# Patient Record
Sex: FEMALE | Race: WHITE | NOT HISPANIC OR LATINO | Employment: OTHER | URBAN - METROPOLITAN AREA
[De-identification: names, ages, dates, MRNs, and addresses within clinical notes are randomized per-mention and may not be internally consistent; named-entity substitution may affect disease eponyms.]

---

## 2021-08-27 ENCOUNTER — HOSPITAL ENCOUNTER (INPATIENT)
Facility: CLINIC | Age: 71
LOS: 2 days | Discharge: HOME | DRG: 522 | End: 2021-08-29
Attending: EMERGENCY MEDICINE | Admitting: INTERNAL MEDICINE
Payer: COMMERCIAL

## 2021-08-27 ENCOUNTER — ANESTHESIA EVENT (OUTPATIENT)
Dept: SURGERY | Facility: CLINIC | Age: 71
DRG: 522 | End: 2021-08-27
Payer: COMMERCIAL

## 2021-08-27 ENCOUNTER — APPOINTMENT (OUTPATIENT)
Dept: GENERAL RADIOLOGY | Facility: CLINIC | Age: 71
DRG: 522 | End: 2021-08-27
Attending: EMERGENCY MEDICINE
Payer: COMMERCIAL

## 2021-08-27 DIAGNOSIS — T07.XXXA MULTIPLE ABRASIONS: ICD-10-CM

## 2021-08-27 DIAGNOSIS — S72.001A CLOSED FRACTURE OF RIGHT HIP, INITIAL ENCOUNTER (H): ICD-10-CM

## 2021-08-27 PROBLEM — G62.9 NEUROPATHY: Status: ACTIVE | Noted: 2021-08-27

## 2021-08-27 PROBLEM — E87.6 HYPOKALEMIA: Status: ACTIVE | Noted: 2021-08-27

## 2021-08-27 PROBLEM — I10 ESSENTIAL HYPERTENSION: Status: ACTIVE | Noted: 2021-08-27

## 2021-08-27 LAB
ABO/RH(D): NORMAL
ANION GAP SERPL CALCULATED.3IONS-SCNC: 11 MMOL/L (ref 3–14)
ANTIBODY SCREEN: NEGATIVE
APTT PPP: 25 SECONDS (ref 22–38)
ATRIAL RATE - MUSE: 92 BPM
BASOPHILS # BLD AUTO: 0 10E3/UL (ref 0–0.2)
BASOPHILS NFR BLD AUTO: 0 %
BUN SERPL-MCNC: 16 MG/DL (ref 7–30)
CALCIUM SERPL-MCNC: 9.2 MG/DL (ref 8.5–10.1)
CHLORIDE BLD-SCNC: 98 MMOL/L (ref 94–109)
CO2 SERPL-SCNC: 22 MMOL/L (ref 20–32)
CREAT SERPL-MCNC: 0.54 MG/DL (ref 0.52–1.04)
DIASTOLIC BLOOD PRESSURE - MUSE: NORMAL MMHG
EOSINOPHIL # BLD AUTO: 0 10E3/UL (ref 0–0.7)
EOSINOPHIL NFR BLD AUTO: 0 %
ERYTHROCYTE [DISTWIDTH] IN BLOOD BY AUTOMATED COUNT: 13 % (ref 10–15)
GFR SERPL CREATININE-BSD FRML MDRD: >90 ML/MIN/1.73M2
GLUCOSE BLD-MCNC: 210 MG/DL (ref 70–99)
HCT VFR BLD AUTO: 35.9 % (ref 35–47)
HGB BLD-MCNC: 12.2 G/DL (ref 11.7–15.7)
IMM GRANULOCYTES # BLD: 0 10E3/UL
IMM GRANULOCYTES NFR BLD: 1 %
INR PPP: 1.04 (ref 0.85–1.15)
INTERPRETATION ECG - MUSE: NORMAL
LYMPHOCYTES # BLD AUTO: 0.9 10E3/UL (ref 0.8–5.3)
LYMPHOCYTES NFR BLD AUTO: 12 %
MCH RBC QN AUTO: 27.7 PG (ref 26.5–33)
MCHC RBC AUTO-ENTMCNC: 34 G/DL (ref 31.5–36.5)
MCV RBC AUTO: 81 FL (ref 78–100)
MONOCYTES # BLD AUTO: 0.4 10E3/UL (ref 0–1.3)
MONOCYTES NFR BLD AUTO: 6 %
NEUTROPHILS # BLD AUTO: 6.3 10E3/UL (ref 1.6–8.3)
NEUTROPHILS NFR BLD AUTO: 81 %
NRBC # BLD AUTO: 0 10E3/UL
NRBC BLD AUTO-RTO: 0 /100
P AXIS - MUSE: 66 DEGREES
PLATELET # BLD AUTO: 232 10E3/UL (ref 150–450)
POTASSIUM BLD-SCNC: 3.1 MMOL/L (ref 3.4–5.3)
POTASSIUM BLD-SCNC: 3.1 MMOL/L (ref 3.4–5.3)
PR INTERVAL - MUSE: 216 MS
QRS DURATION - MUSE: 88 MS
QT - MUSE: 382 MS
QTC - MUSE: 472 MS
R AXIS - MUSE: -10 DEGREES
RBC # BLD AUTO: 4.41 10E6/UL (ref 3.8–5.2)
SARS-COV-2 RNA RESP QL NAA+PROBE: NEGATIVE
SODIUM SERPL-SCNC: 131 MMOL/L (ref 133–144)
SPECIMEN EXPIRATION DATE: NORMAL
SYSTOLIC BLOOD PRESSURE - MUSE: NORMAL MMHG
T AXIS - MUSE: 61 DEGREES
VENTRICULAR RATE- MUSE: 92 BPM
WBC # BLD AUTO: 7.7 10E3/UL (ref 4–11)

## 2021-08-27 PROCEDURE — 36415 COLL VENOUS BLD VENIPUNCTURE: CPT | Performed by: ORTHOPAEDIC SURGERY

## 2021-08-27 PROCEDURE — 258N000003 HC RX IP 258 OP 636: Performed by: INTERNAL MEDICINE

## 2021-08-27 PROCEDURE — 250N000011 HC RX IP 250 OP 636: Performed by: INTERNAL MEDICINE

## 2021-08-27 PROCEDURE — 84132 ASSAY OF SERUM POTASSIUM: CPT | Performed by: INTERNAL MEDICINE

## 2021-08-27 PROCEDURE — 80048 BASIC METABOLIC PNL TOTAL CA: CPT | Performed by: EMERGENCY MEDICINE

## 2021-08-27 PROCEDURE — 71045 X-RAY EXAM CHEST 1 VIEW: CPT

## 2021-08-27 PROCEDURE — 96374 THER/PROPH/DIAG INJ IV PUSH: CPT

## 2021-08-27 PROCEDURE — 120N000001 HC R&B MED SURG/OB

## 2021-08-27 PROCEDURE — 250N000013 HC RX MED GY IP 250 OP 250 PS 637: Performed by: INTERNAL MEDICINE

## 2021-08-27 PROCEDURE — 73502 X-RAY EXAM HIP UNI 2-3 VIEWS: CPT

## 2021-08-27 PROCEDURE — 85025 COMPLETE CBC W/AUTO DIFF WBC: CPT | Performed by: ORTHOPAEDIC SURGERY

## 2021-08-27 PROCEDURE — 86900 BLOOD TYPING SEROLOGIC ABO: CPT | Performed by: ORTHOPAEDIC SURGERY

## 2021-08-27 PROCEDURE — 250N000011 HC RX IP 250 OP 636: Performed by: EMERGENCY MEDICINE

## 2021-08-27 PROCEDURE — 99223 1ST HOSP IP/OBS HIGH 75: CPT | Mod: AI | Performed by: INTERNAL MEDICINE

## 2021-08-27 PROCEDURE — 87635 SARS-COV-2 COVID-19 AMP PRB: CPT | Performed by: EMERGENCY MEDICINE

## 2021-08-27 PROCEDURE — 99285 EMERGENCY DEPT VISIT HI MDM: CPT | Mod: 25

## 2021-08-27 PROCEDURE — 82306 VITAMIN D 25 HYDROXY: CPT | Performed by: PHYSICIAN ASSISTANT

## 2021-08-27 PROCEDURE — 85730 THROMBOPLASTIN TIME PARTIAL: CPT | Performed by: EMERGENCY MEDICINE

## 2021-08-27 PROCEDURE — 85610 PROTHROMBIN TIME: CPT | Performed by: EMERGENCY MEDICINE

## 2021-08-27 PROCEDURE — 36415 COLL VENOUS BLD VENIPUNCTURE: CPT | Performed by: EMERGENCY MEDICINE

## 2021-08-27 PROCEDURE — C9803 HOPD COVID-19 SPEC COLLECT: HCPCS

## 2021-08-27 RX ORDER — CEFAZOLIN SODIUM 2 G/100ML
2 INJECTION, SOLUTION INTRAVENOUS SEE ADMIN INSTRUCTIONS
Status: DISCONTINUED | OUTPATIENT
Start: 2021-08-27 | End: 2021-08-28 | Stop reason: HOSPADM

## 2021-08-27 RX ORDER — HYDROCHLOROTHIAZIDE 25 MG/1
25 TABLET ORAL DAILY
COMMUNITY

## 2021-08-27 RX ORDER — NALOXONE HYDROCHLORIDE 0.4 MG/ML
0.4 INJECTION, SOLUTION INTRAMUSCULAR; INTRAVENOUS; SUBCUTANEOUS
Status: DISCONTINUED | OUTPATIENT
Start: 2021-08-27 | End: 2021-08-29 | Stop reason: HOSPADM

## 2021-08-27 RX ORDER — HYDRALAZINE HYDROCHLORIDE 20 MG/ML
5 INJECTION INTRAMUSCULAR; INTRAVENOUS EVERY 4 HOURS PRN
Status: DISCONTINUED | OUTPATIENT
Start: 2021-08-27 | End: 2021-08-29 | Stop reason: HOSPADM

## 2021-08-27 RX ORDER — NALOXONE HYDROCHLORIDE 0.4 MG/ML
0.2 INJECTION, SOLUTION INTRAMUSCULAR; INTRAVENOUS; SUBCUTANEOUS
Status: DISCONTINUED | OUTPATIENT
Start: 2021-08-27 | End: 2021-08-29 | Stop reason: HOSPADM

## 2021-08-27 RX ORDER — CEFAZOLIN SODIUM 2 G/100ML
2 INJECTION, SOLUTION INTRAVENOUS
Status: DISCONTINUED | OUTPATIENT
Start: 2021-08-28 | End: 2021-08-28 | Stop reason: HOSPADM

## 2021-08-27 RX ORDER — TRANEXAMIC ACID 10 MG/ML
1 INJECTION, SOLUTION INTRAVENOUS ONCE
Status: COMPLETED | OUTPATIENT
Start: 2021-08-28 | End: 2021-08-28

## 2021-08-27 RX ORDER — POTASSIUM CHLORIDE 1500 MG/1
20 TABLET, EXTENDED RELEASE ORAL ONCE
Status: COMPLETED | OUTPATIENT
Start: 2021-08-27 | End: 2021-08-27

## 2021-08-27 RX ORDER — MULTIVIT-MIN/IRON/FOLIC ACID/K 18-600-40
1 CAPSULE ORAL DAILY
COMMUNITY

## 2021-08-27 RX ORDER — HYDROMORPHONE HYDROCHLORIDE 1 MG/ML
0.5 INJECTION, SOLUTION INTRAMUSCULAR; INTRAVENOUS; SUBCUTANEOUS
Status: DISCONTINUED | OUTPATIENT
Start: 2021-08-27 | End: 2021-08-27

## 2021-08-27 RX ORDER — GABAPENTIN 100 MG/1
100 CAPSULE ORAL DAILY
Status: DISCONTINUED | OUTPATIENT
Start: 2021-08-27 | End: 2021-08-28

## 2021-08-27 RX ORDER — HYDROMORPHONE HCL IN WATER/PF 6 MG/30 ML
0.2 PATIENT CONTROLLED ANALGESIA SYRINGE INTRAVENOUS
Status: DISCONTINUED | OUTPATIENT
Start: 2021-08-27 | End: 2021-08-28

## 2021-08-27 RX ORDER — CHLORAL HYDRATE 500 MG
1 CAPSULE ORAL DAILY
COMMUNITY

## 2021-08-27 RX ORDER — ACETAMINOPHEN 650 MG/1
650 SUPPOSITORY RECTAL EVERY 6 HOURS PRN
Status: DISCONTINUED | OUTPATIENT
Start: 2021-08-27 | End: 2021-08-29 | Stop reason: HOSPADM

## 2021-08-27 RX ORDER — SODIUM CHLORIDE 9 MG/ML
INJECTION, SOLUTION INTRAVENOUS CONTINUOUS
Status: DISCONTINUED | OUTPATIENT
Start: 2021-08-27 | End: 2021-08-28 | Stop reason: ALTCHOICE

## 2021-08-27 RX ORDER — ONDANSETRON 4 MG/1
4 TABLET, ORALLY DISINTEGRATING ORAL EVERY 6 HOURS PRN
Status: DISCONTINUED | OUTPATIENT
Start: 2021-08-27 | End: 2021-08-28

## 2021-08-27 RX ORDER — ONDANSETRON 2 MG/ML
4 INJECTION INTRAMUSCULAR; INTRAVENOUS EVERY 6 HOURS PRN
Status: DISCONTINUED | OUTPATIENT
Start: 2021-08-27 | End: 2021-08-28

## 2021-08-27 RX ORDER — AMOXICILLIN 250 MG
2 CAPSULE ORAL 2 TIMES DAILY PRN
Status: DISCONTINUED | OUTPATIENT
Start: 2021-08-27 | End: 2021-08-29 | Stop reason: HOSPADM

## 2021-08-27 RX ORDER — AMOXICILLIN 250 MG
1 CAPSULE ORAL 2 TIMES DAILY PRN
Status: DISCONTINUED | OUTPATIENT
Start: 2021-08-27 | End: 2021-08-29 | Stop reason: HOSPADM

## 2021-08-27 RX ORDER — POTASSIUM CHLORIDE 7.45 MG/ML
10 INJECTION INTRAVENOUS ONCE
Status: DISCONTINUED | OUTPATIENT
Start: 2021-08-27 | End: 2021-08-27

## 2021-08-27 RX ORDER — OXYCODONE HYDROCHLORIDE 5 MG/1
5 TABLET ORAL EVERY 4 HOURS PRN
Status: DISCONTINUED | OUTPATIENT
Start: 2021-08-27 | End: 2021-08-28

## 2021-08-27 RX ORDER — ACETAMINOPHEN 325 MG/1
650 TABLET ORAL EVERY 6 HOURS PRN
Status: DISCONTINUED | OUTPATIENT
Start: 2021-08-27 | End: 2021-08-28

## 2021-08-27 RX ORDER — LIDOCAINE 40 MG/G
CREAM TOPICAL
Status: DISCONTINUED | OUTPATIENT
Start: 2021-08-27 | End: 2021-08-28

## 2021-08-27 RX ORDER — TRANEXAMIC ACID 10 MG/ML
1 INJECTION, SOLUTION INTRAVENOUS ONCE
Status: DISCONTINUED | OUTPATIENT
Start: 2021-08-28 | End: 2021-08-28 | Stop reason: HOSPADM

## 2021-08-27 RX ADMIN — ACETAMINOPHEN 650 MG: 325 TABLET, FILM COATED ORAL at 20:38

## 2021-08-27 RX ADMIN — ONDANSETRON 4 MG: 4 TABLET, ORALLY DISINTEGRATING ORAL at 20:38

## 2021-08-27 RX ADMIN — HYDROMORPHONE HYDROCHLORIDE 0.5 MG: 1 INJECTION, SOLUTION INTRAMUSCULAR; INTRAVENOUS; SUBCUTANEOUS at 14:09

## 2021-08-27 RX ADMIN — POTASSIUM CHLORIDE 20 MEQ: 1500 TABLET, EXTENDED RELEASE ORAL at 18:50

## 2021-08-27 RX ADMIN — SODIUM CHLORIDE: 9 INJECTION, SOLUTION INTRAVENOUS at 18:41

## 2021-08-27 ASSESSMENT — MIFFLIN-ST. JEOR: SCORE: 1145.92

## 2021-08-27 ASSESSMENT — ACTIVITIES OF DAILY LIVING (ADL): ADLS_ACUITY_SCORE: 15

## 2021-08-27 ASSESSMENT — ENCOUNTER SYMPTOMS: ARTHRALGIAS: 1

## 2021-08-27 NOTE — ED PROVIDER NOTES
History   Chief Complaint:  Hip Pain       HPI   Lorna Yanez is a 70 year old female with history of hypertension who presents with hip pain. The patient reports that she fell while walking on the sidewalk this afternoon. She fell to the ground, injuring her right hip and right elbow. She denies hitting her head or loss of consciousness. The patient is unable to bear weight on her right leg.     Review of Systems   Musculoskeletal: Positive for arthralgias and gait problem.   All other systems reviewed and are negative.    Allergies:  Amlodipine  Codeine  Losartan    Medications:  Hydrochlorothiazide  Hypertension  Heart murmur    Past Medical History:    No known past medical history    Past Surgical History:    Hip replacement  Knee replacement     Family History:    No known family history    Social History:  Arrives via triage  Accompanied by  in ED    Physical Exam     Patient Vitals for the past 24 hrs:   BP Temp Temp src Pulse Resp SpO2   08/27/21 1340 (!) 145/76 98  F (36.7  C) Temporal 85 18 98 %       Physical Exam  Constitutional: Middle aged white female sitting in chair crying out in pain.  HENT: No signs of trauma.   Eyes: EOM are normal. Pupils are equal, round, and reactive to light.   Neck: Normal range of motion. No JVD present. No cervical adenopathy.  Cardiovascular: Regular rhythm.  Exam reveals no gallop and no friction rub.    No murmur heard.  Pulmonary/Chest: Bilateral breath sounds normal. No wheezes, rhonchi or rales.  Abdominal: Soft. No tenderness. No rebound or guarding.   Musculoskeletal: Right arm abrasion over elbow, full range of motion, no bony tenderness. Abrasion to right hand ulnar aspect of MCP joint little finger, no bony tenderness. Left hand abrasion mid palmar region between thenar and hyperthenar eminence, no bony tenderness. Right hip tenderness posteriorly, patient resists motion. Distal CMS intact.   Lymphadenopathy: No lymphadenopathy.   Neurological:  Alert and oriented to person, place, and time. Normal strength. Coordination normal.   Skin: Skin is warm and dry. No rash noted. No erythema. GCS 15.      Emergency Department Course   ECG  ECG taken at 1548  Sinus rhythm with sinus arrhythmia with 1st degree AV block  Cannot rule out anterior infarct, age undetermined   Rate 92 bpm. TX interval 216 ms. QRS duration 88 ms. QT/QTc 382/472 ms. P-R-T axes 66 -10 61.     Imaging:    XR Chest 1 View  No acute disease.    XR Pelvis and Hip Right 2 Views  Displaced transverse fracture right femoral neck with  typical varus angulation at the fracture site. Mild right hip  degenerative changes.    Read per radiology    Laboratory:    BMP: sodium 131, potassium 3.1, glucose 210 o/w WNL (Creatinine 0.54)     Emergency Department Course:    Reviewed:  I reviewed nursing notes, vitals, past medical history and care everywhere    Assessments:  1359 I obtained history and examined the patient as noted above.   1522 I rechecked the patient and explained findings.     Interventions:  1409 Dilaudid 0.5 mg IV  kcl 10 meq IV   Disposition:  The patient was admitted to the hospital under the care of Dr. Aguayo .     Impression & Plan     Medical Decision Making:  Lorna Yanez is a 70 year-old woman who tripped and fell landing on her right hip. She also abraded her right elbow and scraped her hand. She was unable to get up and feels her hip is broken. She is otherwise healthy. On examination she has pain with nay movement of the right hip and tenderness posteriorly. She has an abrasion on her right elbow, the side of her right hand, and her left palm without any bony tenderness. Patient had an IV started, blood was drawn, and received pain medications and X-ray confirms a right hip fracture of the femoral neck with displacement. Chest X ray unremarkable, EKG unremarkable and lab work so far is unremarkable. The patient will be discussed with orthopedics and the hospitalist and  admitted for orthopedic repair.     Covid-19  Lorna Yanez was evaluated during a global COVID-19 pandemic, which necessitated consideration that the patient might be at risk for infection with the SARS-CoV-2 virus that causes COVID-19.   Applicable protocols for evaluation were followed during the patient's care.   COVID-19 was considered as part of the patient's evaluation. The plan for testing is:  a test was obtained during this visit.    Diagnosis:    ICD-10-CM    1. Closed fracture of right hip, initial encounter (H)  S72.001A    2. Multiple abrasions  T07.XXXA      Scribe Disclosure:  INicola, am serving as a scribe at 1:59 PM on 8/27/2021 to document services personally performed by Trung Richardson MD based on my observations and the provider's statements to me.              Trung Richardson MD  08/27/21 5067

## 2021-08-27 NOTE — PHARMACY-ADMISSION MEDICATION HISTORY
Pharmacy Medication History  Admission medication history interview status for the 8/27/2021 admission is complete. See EPIC admission navigator for prior to admission medications     Location of Interview: Patient room  Medication history sources: Patient    Significant changes made to the medication list:  Added: all medications    In the past week, patient estimated taking medication this percent of the time: greater than 90%    Additional medication history information:   None    Medication reconciliation completed by provider prior to medication history? No    Time spent in this activity: 10 minutes    Prior to Admission medications    Medication Sig Last Dose Taking? Auth Provider   Ascorbic Acid (VITAMIN C) 500 MG CAPS Take 1 capsule by mouth daily 8/27/2021 at Unknown time Yes Unknown, Entered By History   Coenzyme Q10 (COQ10 PO) Take 1 capsule (unknown dose) by mouth daily 8/27/2021 at Unknown time Yes Unknown, Entered By History   fish oil-omega-3 fatty acids 1000 MG capsule Take 2 g by mouth daily 8/27/2021 at Unknown time Yes Unknown, Entered By History   FOLIC ACID PO Take 1 tablet (unknown dose) by mouth daily 8/27/2021 at Unknown time Yes Unknown, Entered By History   hydrochlorothiazide (HYDRODIURIL) 25 MG tablet Take 25 mg by mouth daily 8/27/2021 at Unknown time Yes Unknown, Entered By History   Multiple Vitamins-Minerals (ZINC PO) Take 1 capsule (unknown dose) by mouth daily 8/27/2021 at Unknown time Yes Unknown, Entered By History   VITAMIN D PO Take 1 capsule (unknown dose) by mouth daily 8/27/2021 at Unknown time Yes Unknown, Entered By History       The information provided in this note is only as accurate as the sources available at the time of update(s)

## 2021-08-27 NOTE — H&P
[facility]    History and Physical - Hospitalist Service       Date of Admission:  8/27/2021    Assessment & Plan      Lorna Yanez is a 70 year old female with history of hypertension, osteoarthritis, neuropathy who is admitted on 8/27/2021. She fell outside on a  grating.  An x-ray of the pelvis shows right femoral neck fracture with varus angulation.    Principal Problem:    Closed fracture of right hip, initial encounter (H)    Admit as inpatient    N.p.o., bedrest    Orthopedic consult requested    Treat pain.  Patient says that he does not typically take pain relievers, Dilaudid made her feel somewhat unwell.  If this persists or worsens, could try IV morphine    With the exception of hypokalemia, there are no medical conditions which can be/should be optimized prior to proceeding with surgery    Active Problems:    Multiple abrasions    Local cares    Hypokalemia    Replace potassium per protocol    Essential hypertension    Hyponatremia    Hold hydrochlorothiazide since pain, stress and opioids can all contribute to ADH release and worsen hyponatremia    This is likely hypovolemic hyponatremia, give gentle rehydration    Recheck BMP in a.m.    Hydralazine as needed    Neuropathy    Patient reports a history of neuropathy, details are unknown    Add very low-dose gabapentin, see if she gets relief        Diet: NPO for Medical/Clinical Reasons Except for: Meds    DVT Prophylaxis: Pneumatic Compression Devices  Mckinnon Catheter: Not present  Central Lines: None  Code Status:   Full    Clinically Significant Risk Factors Present on Admission         # Hyponatremia: Na = 131 mmol/L (Ref range: 133 - 144 mmol/L) on admission, will monitor as appropriate           Disposition Plan   Expected discharge: Tomorrow or Sunday, depending on timing for surgery recommended to prior living arrangement once hip fracture is repaired, pain is controlled and patient is functioning at baseline.    "  The patient's care was discussed with the Patient, Patient's Family and ED MD, Dr. Hernandez.    Luz Aguayo MD  [facility]  Securely message with the [employer] Web Console (learn more here)  Text page via [employer] Paging/Directory      ______________________________________________________________________    Chief Complaint   \"I was calling to my granddaughter and the  cover was raised up just a little bit.\"    History of Present Illness   Lorna Yanez is a very healthy 70 year old female with history of hypertension, osteoarthritis, neuropathy who presents emergency department after a fall.    She was meeting her granddaughter in [place] for her birthday lunch (Lorna's birthday is on Sunday).  She says she stepped off the curb and all manhole cover was slightly raised.  She tripped over this, fell on her right side and had immediate, sharp pain in her hip.  She somehow got in the car, despite being unable to bear weight on the right hip.  She scraped her right elbow.    She came to the emergency department for evaluation where x-rays the pelvis show a femoral neck fracture with varus angulation.  She has an abrasion on her right elbow.  She received some IV Dilaudid for pain control and says that strong pain relievers make her feel sick.  She says she has terrible pain in the right hip, \"I cannot take this pain all night long.\"    Lorna is very health-conscious, says she eats a healthy diet and exercises regularly.  She is understandably frustrated at such a minor fall resulted in a hip fracture.  She is admitted for further evaluation and treatment.    Review of Systems    The 10 point Review of Systems is negative other than noted in the HPI or here.     Past Medical History    I have reviewed this patient's medical history and updated it with pertinent information if needed.   Other unspecified back disorder   multiple surgeries   HTN (hypertension)       Neuropathy of both " "feet       OA (osteoarthritis)       Neck pain       Thyroid nodule         Past Surgical History   I have reviewed this patient's surgical history and updated it with pertinent information if needed.  KNEE REPLACEMENT 1/1/2002 - 12/31/2002 Right      BACK SURGERY 1/1/1998 - 12/31/1998   for lumbar L4-5 herniated disc     VEIN STRIPPING          AMINA AND BSO 1/1/2010 - 12/31/2010        (IA) UT KNEE SCOPE MED W LAT MENISCECT WWO DEBRIDE/SHAVE ANY CO          Social History   I have reviewed this patient's social history and updated it with pertinent information if needed.  Former Smoker       Quit: 01/01/1970   Smokeless Tobacco: Never Used           Alcohol Use Standard Drinks/Week Comments   No 0 (1 standard drink = 0.6 oz pure alcohol)           Family History     Diabetes Brother       Hypertension Father       Hyperlipidemia Mother         Relation Name Status Comments   Brother         Father         Mother            Prior to Admission Medications   None     Allergies   Allergies   Allergen Reactions     Codeine Anxiety and Nausea and Vomiting     Rosuvastatin Other (See Comments)     Per H&P  Throat felt swollen.       Amlodipine Other (See Comments)     Losartan Other (See Comments)     \"subjective facial swelling not appreciated on exam\" per H&P  Paresthesias, subjective facial swelling not appreciated on exam       Physical Exam   Vital Signs: Temp: 98  F (36.7  C) Temp src: Temporal BP: (!) 145/76 Pulse: 85   Resp: 18 SpO2: 98 % O2 Device: None (Room air)    Weight: 0 lbs 0 oz    Constitutional: Awake, alert.  Looks distressed due to right hip pain  Eyes: Conjunctiva and pupils examined and normal.  HEENT: Moist mucous membranes, normal dentition.  Respiratory: Clear to auscultation bilaterally  Cardiovascular: Regular rate and rhythm  GI: Soft, non-distended, non-tender, normal bowel sounds.  Lymph/Hematologic: No anterior cervical or supraclavicular adenopathy.  Skin: No rash on exposed skin.  She has a " superficial abrasion overlying the right elbow.  Musculoskeletal: MCPs of both hands are slightly enlarged, consistent with osteoarthritis.  There are no obvious joint effusions, joints are not red or tender  Neurologic: Face is symmetric.  She can move both arms and both legs.  The neurologic examination is nonfocal  Psychiatric: Mood is very pleasant, but somewhat anxious      Data   Data reviewed today: I reviewed all medications, new labs and imaging results over the last 24 hours. I personally reviewed the chest x-ray image(s) showing No infiltrates and the Pelvis x-ray image(s) showing Right femoral neck fracture with varus angulation.    Recent Labs   Lab 08/27/21  1410   *   POTASSIUM 3.1*   CHLORIDE 98   CO2 22   BUN 16   CR 0.54   ANIONGAP 11   LÓPEZ 9.2   *     Recent Results (from the past 24 hour(s))   XR Pelvis and Hip Right 2 Views    Narrative    PELVIS AND HIP RIGHT TWO VIEWS   8/27/2021 2:29 PM     HISTORY: Pain.    COMPARISON: None.      Impression    IMPRESSION: Displaced transverse fracture right femoral neck with  typical varus angulation at the fracture site. Mild right hip  degenerative changes.    RICHARD MCMAHAN MD         SYSTEM ID:  [id number]   XR Chest 1 View    Narrative    CHEST ONE VIEW  8/27/2021 2:29 PM     HISTORY: Question hip fracture;  get chest x-ray if hip fracture.    COMPARISON: None.      Impression    IMPRESSION: No acute disease.    EARL GATES MD         SYSTEM ID:  [id number]

## 2021-08-27 NOTE — ED TRIAGE NOTES
Patient states she fell and injured her right hip.  States she feels it is broken.  Unable to bear weight.

## 2021-08-27 NOTE — PROGRESS NOTES
Ortho Treatment plan    OR tomorrow 8/27 for right hip hemiarthroplasty with Dr. Varma  NPO after midnight  May have diet today  Bed rest  Prefer pure wick over fletcher if at all possible  Pain medication as needed, limit narcotics as able  STAT COVID test  Type and cross  Vit D lab  Pre-op optimization per hospitalist    Formal consult in AM    Nisha Bowman PAC

## 2021-08-27 NOTE — PROGRESS NOTES
RECEIVING UNIT ED HANDOFF REVIEW    ED Nurse Handoff Report was reviewed by: Blanca Rodriguez RN on August 27, 2021 at 4:38 PM

## 2021-08-27 NOTE — ED NOTES
"[facility]  ED Nurse Handoff Report    ED Chief complaint: Hip Pain      ED Diagnosis:   Final diagnoses:   Closed fracture of right hip, initial encounter (H)   Multiple abrasions       Code Status: Full Code    Allergies:   Allergies   Allergen Reactions     Codeine Anxiety and Nausea and Vomiting     Rosuvastatin Other (See Comments)     Per H&P  Throat felt swollen.       Amlodipine Other (See Comments)     Losartan Other (See Comments)     \"subjective facial swelling not appreciated on exam\" per H&P  Paresthesias, subjective facial swelling not appreciated on exam       Patient Story: Patient states she fell and injured her right hip.  States she feels it is broken.  Unable to bear weight.     Focused Assessment:  Right hip pain. Pt c/o feeling hungry but understands she needs to stay NPO.    Treatments and/or interventions provided:   Medications   HYDROmorphone (PF) (DILAUDID) injection 0.5 mg (0.5 mg Intravenous Given 8/27/21 1409)   potassium chloride 10 mEq in 100 mL sterile water intermittent infusion (premix) (has no administration in time range)     Patient's response to treatments and/or interventions: improvement in pain after Dilaudid    To be done/followed up on inpatient unit:  potassium drip if not started in the ED    Does this patient have any cognitive concerns?: NA    Activity level - Baseline/Home:  Independent  Activity Level - Current:   Total Care    Patient's Preferred language: English   Needed?: No    Isolation: None  Infection: Not Applicable  Patient tested for COVID 19 prior to admission: YES  Bariatric?: No    Vital Signs:   Vitals:    08/27/21 1340   BP: (!) 145/76   Pulse: 85   Resp: 18   Temp: 98  F (36.7  C)   TempSrc: Temporal   SpO2: 98%       Cardiac Rhythm:     Was the PSS-3 completed:   Yes  What interventions are required if any?               Family Comments: no family present  OBS brochure/video discussed/provided to patient/family: N/A        "       Name of person given brochure if not patient: NA              Relationship to patient: NA    For the majority of the shift this patient's behavior was Green.   Behavioral interventions performed were NA.    ED NURSE PHONE NUMBER: [phone]

## 2021-08-27 NOTE — PLAN OF CARE
Patient transferred from ED @ 1800. A&O x4, anxious. Bedrest, Denies chest pain or SOB. Rating pain 8-10/10, requested to take meds after dinner. Potassium replaced, recheck @ 2300. IVF infusing. Voiding per jace. NPO after midnight, surgery tomorrow. Will continue to monitor.

## 2021-08-28 ENCOUNTER — APPOINTMENT (OUTPATIENT)
Dept: GENERAL RADIOLOGY | Facility: CLINIC | Age: 71
DRG: 522 | End: 2021-08-28
Attending: ORTHOPAEDIC SURGERY
Payer: COMMERCIAL

## 2021-08-28 ENCOUNTER — ANESTHESIA (OUTPATIENT)
Dept: SURGERY | Facility: CLINIC | Age: 71
DRG: 522 | End: 2021-08-28
Payer: COMMERCIAL

## 2021-08-28 ENCOUNTER — APPOINTMENT (OUTPATIENT)
Dept: GENERAL RADIOLOGY | Facility: CLINIC | Age: 71
DRG: 522 | End: 2021-08-28
Attending: PHYSICIAN ASSISTANT
Payer: COMMERCIAL

## 2021-08-28 LAB
ANION GAP SERPL CALCULATED.3IONS-SCNC: 2 MMOL/L (ref 3–14)
BUN SERPL-MCNC: 12 MG/DL (ref 7–30)
CALCIUM SERPL-MCNC: 8.6 MG/DL (ref 8.5–10.1)
CHLORIDE BLD-SCNC: 100 MMOL/L (ref 94–109)
CO2 SERPL-SCNC: 30 MMOL/L (ref 20–32)
CREAT SERPL-MCNC: 0.48 MG/DL (ref 0.52–1.04)
ERYTHROCYTE [DISTWIDTH] IN BLOOD BY AUTOMATED COUNT: 13.1 % (ref 10–15)
GFR SERPL CREATININE-BSD FRML MDRD: >90 ML/MIN/1.73M2
GLUCOSE BLD-MCNC: 117 MG/DL (ref 70–99)
HCT VFR BLD AUTO: 35.1 % (ref 35–47)
HGB BLD-MCNC: 11.9 G/DL (ref 11.7–15.7)
MCH RBC QN AUTO: 27.9 PG (ref 26.5–33)
MCHC RBC AUTO-ENTMCNC: 33.9 G/DL (ref 31.5–36.5)
MCV RBC AUTO: 82 FL (ref 78–100)
PLATELET # BLD AUTO: 211 10E3/UL (ref 150–450)
POTASSIUM BLD-SCNC: 3.6 MMOL/L (ref 3.4–5.3)
POTASSIUM BLD-SCNC: 3.6 MMOL/L (ref 3.4–5.3)
RBC # BLD AUTO: 4.27 10E6/UL (ref 3.8–5.2)
SODIUM SERPL-SCNC: 132 MMOL/L (ref 133–144)
WBC # BLD AUTO: 5.9 10E3/UL (ref 4–11)

## 2021-08-28 PROCEDURE — C1776 JOINT DEVICE (IMPLANTABLE): HCPCS | Performed by: ORTHOPAEDIC SURGERY

## 2021-08-28 PROCEDURE — 258N000003 HC RX IP 258 OP 636: Performed by: INTERNAL MEDICINE

## 2021-08-28 PROCEDURE — 120N000001 HC R&B MED SURG/OB

## 2021-08-28 PROCEDURE — 272N000001 HC OR GENERAL SUPPLY STERILE: Performed by: ORTHOPAEDIC SURGERY

## 2021-08-28 PROCEDURE — 250N000011 HC RX IP 250 OP 636: Performed by: PHYSICIAN ASSISTANT

## 2021-08-28 PROCEDURE — 258N000003 HC RX IP 258 OP 636: Performed by: PHYSICIAN ASSISTANT

## 2021-08-28 PROCEDURE — 710N000009 HC RECOVERY PHASE 1, LEVEL 1, PER MIN: Performed by: ORTHOPAEDIC SURGERY

## 2021-08-28 PROCEDURE — 250N000009 HC RX 250: Performed by: ORTHOPAEDIC SURGERY

## 2021-08-28 PROCEDURE — 85027 COMPLETE CBC AUTOMATED: CPT | Performed by: INTERNAL MEDICINE

## 2021-08-28 PROCEDURE — 80048 BASIC METABOLIC PNL TOTAL CA: CPT | Performed by: ORTHOPAEDIC SURGERY

## 2021-08-28 PROCEDURE — 250N000025 HC SEVOFLURANE, PER MIN: Performed by: ORTHOPAEDIC SURGERY

## 2021-08-28 PROCEDURE — 250N000013 HC RX MED GY IP 250 OP 250 PS 637: Performed by: PHYSICIAN ASSISTANT

## 2021-08-28 PROCEDURE — 80048 BASIC METABOLIC PNL TOTAL CA: CPT | Performed by: INTERNAL MEDICINE

## 2021-08-28 PROCEDURE — 360N000077 HC SURGERY LEVEL 4, PER MIN: Performed by: ORTHOPAEDIC SURGERY

## 2021-08-28 PROCEDURE — 258N000003 HC RX IP 258 OP 636: Performed by: ORTHOPAEDIC SURGERY

## 2021-08-28 PROCEDURE — 250N000013 HC RX MED GY IP 250 OP 250 PS 637: Performed by: INTERNAL MEDICINE

## 2021-08-28 PROCEDURE — 999N000141 HC STATISTIC PRE-PROCEDURE NURSING ASSESSMENT: Performed by: ORTHOPAEDIC SURGERY

## 2021-08-28 PROCEDURE — 999N000063 XR PELVIS PORT 1/2 VIEWS

## 2021-08-28 PROCEDURE — 278N000051 HC OR IMPLANT GENERAL: Performed by: ORTHOPAEDIC SURGERY

## 2021-08-28 PROCEDURE — 99232 SBSQ HOSP IP/OBS MODERATE 35: CPT | Performed by: INTERNAL MEDICINE

## 2021-08-28 PROCEDURE — 250N000011 HC RX IP 250 OP 636: Performed by: NURSE ANESTHETIST, CERTIFIED REGISTERED

## 2021-08-28 PROCEDURE — 370N000017 HC ANESTHESIA TECHNICAL FEE, PER MIN: Performed by: ORTHOPAEDIC SURGERY

## 2021-08-28 PROCEDURE — 250N000013 HC RX MED GY IP 250 OP 250 PS 637: Performed by: ORTHOPAEDIC SURGERY

## 2021-08-28 PROCEDURE — 250N000009 HC RX 250: Performed by: NURSE ANESTHETIST, CERTIFIED REGISTERED

## 2021-08-28 PROCEDURE — 250N000011 HC RX IP 250 OP 636: Performed by: ORTHOPAEDIC SURGERY

## 2021-08-28 PROCEDURE — 0SR9049 REPLACEMENT OF RIGHT HIP JOINT WITH CERAMIC ON POLYETHYLENE SYNTHETIC SUBSTITUTE, CEMENTED, OPEN APPROACH: ICD-10-PCS | Performed by: ORTHOPAEDIC SURGERY

## 2021-08-28 PROCEDURE — 250N000011 HC RX IP 250 OP 636: Performed by: ANESTHESIOLOGY

## 2021-08-28 PROCEDURE — 999N000063 XR PELVIS AD HIP PORTABLE RIGHT 1 VIEW

## 2021-08-28 PROCEDURE — 258N000003 HC RX IP 258 OP 636: Performed by: ANESTHESIOLOGY

## 2021-08-28 PROCEDURE — 36415 COLL VENOUS BLD VENIPUNCTURE: CPT | Performed by: INTERNAL MEDICINE

## 2021-08-28 PROCEDURE — 250N000009 HC RX 250: Performed by: PHYSICIAN ASSISTANT

## 2021-08-28 PROCEDURE — 258N000001 HC RX 258: Performed by: ORTHOPAEDIC SURGERY

## 2021-08-28 DEVICE — BONE CEMENT SIMPLEX FULL DOSE 6191-1-001: Type: IMPLANTABLE DEVICE | Site: HIP | Status: FUNCTIONAL

## 2021-08-28 DEVICE — UNIVERSAL DISTAL CEMENT SPACER
Type: IMPLANTABLE DEVICE | Site: HIP | Status: FUNCTIONAL
Brand: OMNIFIT

## 2021-08-28 DEVICE — HIP STEM
Type: IMPLANTABLE DEVICE | Site: HIP | Status: FUNCTIONAL
Brand: OMNIFIT

## 2021-08-28 DEVICE — TRIDENT II TRITANIUM CLUSTER 50D
Type: IMPLANTABLE DEVICE | Site: HIP | Status: FUNCTIONAL
Brand: TRIDENT II

## 2021-08-28 DEVICE — BUCK FEMORAL CEMENT RESTRICTOR 18.5MM
Type: IMPLANTABLE DEVICE | Site: HIP | Status: FUNCTIONAL
Brand: BUCK

## 2021-08-28 DEVICE — CERAMIC C-TAPER FEMORAL HEAD
Type: IMPLANTABLE DEVICE | Site: HIP | Status: FUNCTIONAL
Brand: BIOLOX

## 2021-08-28 DEVICE — 0 DEGREE POLYETHYLENE INSERT
Type: IMPLANTABLE DEVICE | Site: HIP | Status: FUNCTIONAL
Brand: TRIDENT

## 2021-08-28 DEVICE — 6.5MM LOW PROFILE HEX SCREW 25MM
Type: IMPLANTABLE DEVICE | Site: HIP | Status: FUNCTIONAL
Brand: TRIDENT II

## 2021-08-28 RX ORDER — FENTANYL CITRATE 0.05 MG/ML
50 INJECTION, SOLUTION INTRAMUSCULAR; INTRAVENOUS EVERY 5 MIN PRN
Status: DISCONTINUED | OUTPATIENT
Start: 2021-08-28 | End: 2021-08-28 | Stop reason: HOSPADM

## 2021-08-28 RX ORDER — ONDANSETRON 4 MG/1
4 TABLET, ORALLY DISINTEGRATING ORAL EVERY 30 MIN PRN
Status: DISCONTINUED | OUTPATIENT
Start: 2021-08-28 | End: 2021-08-28 | Stop reason: HOSPADM

## 2021-08-28 RX ORDER — PROCHLORPERAZINE MALEATE 5 MG
5 TABLET ORAL EVERY 6 HOURS PRN
Status: DISCONTINUED | OUTPATIENT
Start: 2021-08-28 | End: 2021-08-29 | Stop reason: HOSPADM

## 2021-08-28 RX ORDER — ONDANSETRON 2 MG/ML
4 INJECTION INTRAMUSCULAR; INTRAVENOUS EVERY 30 MIN PRN
Status: DISCONTINUED | OUTPATIENT
Start: 2021-08-28 | End: 2021-08-28 | Stop reason: HOSPADM

## 2021-08-28 RX ORDER — ACETAMINOPHEN 325 MG/1
650 TABLET ORAL EVERY 4 HOURS PRN
Status: DISCONTINUED | OUTPATIENT
Start: 2021-08-31 | End: 2021-08-29 | Stop reason: HOSPADM

## 2021-08-28 RX ORDER — ONDANSETRON 2 MG/ML
4 INJECTION INTRAMUSCULAR; INTRAVENOUS EVERY 6 HOURS PRN
Status: DISCONTINUED | OUTPATIENT
Start: 2021-08-28 | End: 2021-08-29 | Stop reason: HOSPADM

## 2021-08-28 RX ORDER — IBUPROFEN 600 MG/1
600 TABLET, FILM COATED ORAL EVERY 6 HOURS PRN
Status: DISCONTINUED | OUTPATIENT
Start: 2021-08-28 | End: 2021-08-29 | Stop reason: HOSPADM

## 2021-08-28 RX ORDER — SODIUM CHLORIDE, SODIUM LACTATE, POTASSIUM CHLORIDE, CALCIUM CHLORIDE 600; 310; 30; 20 MG/100ML; MG/100ML; MG/100ML; MG/100ML
INJECTION, SOLUTION INTRAVENOUS CONTINUOUS
Status: DISCONTINUED | OUTPATIENT
Start: 2021-08-28 | End: 2021-08-28 | Stop reason: HOSPADM

## 2021-08-28 RX ORDER — DEXAMETHASONE SODIUM PHOSPHATE 4 MG/ML
INJECTION, SOLUTION INTRA-ARTICULAR; INTRALESIONAL; INTRAMUSCULAR; INTRAVENOUS; SOFT TISSUE PRN
Status: DISCONTINUED | OUTPATIENT
Start: 2021-08-28 | End: 2021-08-28

## 2021-08-28 RX ORDER — HYDROMORPHONE HCL IN WATER/PF 6 MG/30 ML
0.4 PATIENT CONTROLLED ANALGESIA SYRINGE INTRAVENOUS
Status: DISCONTINUED | OUTPATIENT
Start: 2021-08-28 | End: 2021-08-29 | Stop reason: HOSPADM

## 2021-08-28 RX ORDER — SODIUM CHLORIDE, SODIUM LACTATE, POTASSIUM CHLORIDE, CALCIUM CHLORIDE 600; 310; 30; 20 MG/100ML; MG/100ML; MG/100ML; MG/100ML
INJECTION, SOLUTION INTRAVENOUS CONTINUOUS
Status: DISCONTINUED | OUTPATIENT
Start: 2021-08-28 | End: 2021-08-29 | Stop reason: HOSPADM

## 2021-08-28 RX ORDER — LIDOCAINE HYDROCHLORIDE 20 MG/ML
INJECTION, SOLUTION INFILTRATION; PERINEURAL PRN
Status: DISCONTINUED | OUTPATIENT
Start: 2021-08-28 | End: 2021-08-28

## 2021-08-28 RX ORDER — LIDOCAINE 40 MG/G
CREAM TOPICAL
Status: DISCONTINUED | OUTPATIENT
Start: 2021-08-28 | End: 2021-08-28 | Stop reason: HOSPADM

## 2021-08-28 RX ORDER — FENTANYL CITRATE 50 UG/ML
INJECTION, SOLUTION INTRAMUSCULAR; INTRAVENOUS PRN
Status: DISCONTINUED | OUTPATIENT
Start: 2021-08-28 | End: 2021-08-28

## 2021-08-28 RX ORDER — POLYETHYLENE GLYCOL 3350 17 G/17G
17 POWDER, FOR SOLUTION ORAL DAILY
Status: DISCONTINUED | OUTPATIENT
Start: 2021-08-29 | End: 2021-08-29 | Stop reason: HOSPADM

## 2021-08-28 RX ORDER — BISACODYL 10 MG
10 SUPPOSITORY, RECTAL RECTAL DAILY PRN
Status: DISCONTINUED | OUTPATIENT
Start: 2021-08-28 | End: 2021-08-29 | Stop reason: HOSPADM

## 2021-08-28 RX ORDER — EPHEDRINE SULFATE 50 MG/ML
INJECTION, SOLUTION INTRAMUSCULAR; INTRAVENOUS; SUBCUTANEOUS PRN
Status: DISCONTINUED | OUTPATIENT
Start: 2021-08-28 | End: 2021-08-28

## 2021-08-28 RX ORDER — ONDANSETRON 4 MG/1
4 TABLET, ORALLY DISINTEGRATING ORAL EVERY 6 HOURS PRN
Status: DISCONTINUED | OUTPATIENT
Start: 2021-08-28 | End: 2021-08-29 | Stop reason: HOSPADM

## 2021-08-28 RX ORDER — OXYCODONE HYDROCHLORIDE 5 MG/1
10 TABLET ORAL EVERY 4 HOURS PRN
Status: DISCONTINUED | OUTPATIENT
Start: 2021-08-28 | End: 2021-08-29 | Stop reason: HOSPADM

## 2021-08-28 RX ORDER — MAGNESIUM HYDROXIDE 1200 MG/15ML
LIQUID ORAL PRN
Status: DISCONTINUED | OUTPATIENT
Start: 2021-08-28 | End: 2021-08-28 | Stop reason: HOSPADM

## 2021-08-28 RX ORDER — ONDANSETRON 2 MG/ML
INJECTION INTRAMUSCULAR; INTRAVENOUS PRN
Status: DISCONTINUED | OUTPATIENT
Start: 2021-08-28 | End: 2021-08-28

## 2021-08-28 RX ORDER — POTASSIUM CHLORIDE 1500 MG/1
20 TABLET, EXTENDED RELEASE ORAL ONCE
Status: COMPLETED | OUTPATIENT
Start: 2021-08-28 | End: 2021-08-28

## 2021-08-28 RX ORDER — AMOXICILLIN 250 MG
1 CAPSULE ORAL 2 TIMES DAILY
Status: DISCONTINUED | OUTPATIENT
Start: 2021-08-28 | End: 2021-08-29 | Stop reason: HOSPADM

## 2021-08-28 RX ORDER — OXYCODONE HYDROCHLORIDE 5 MG/1
5 TABLET ORAL EVERY 4 HOURS PRN
Status: DISCONTINUED | OUTPATIENT
Start: 2021-08-28 | End: 2021-08-29 | Stop reason: HOSPADM

## 2021-08-28 RX ORDER — CEFAZOLIN SODIUM 1 G/3ML
1 INJECTION, POWDER, FOR SOLUTION INTRAMUSCULAR; INTRAVENOUS EVERY 8 HOURS
Status: COMPLETED | OUTPATIENT
Start: 2021-08-28 | End: 2021-08-29

## 2021-08-28 RX ORDER — HYDROMORPHONE HCL IN WATER/PF 6 MG/30 ML
0.2 PATIENT CONTROLLED ANALGESIA SYRINGE INTRAVENOUS
Status: DISCONTINUED | OUTPATIENT
Start: 2021-08-28 | End: 2021-08-29 | Stop reason: HOSPADM

## 2021-08-28 RX ORDER — HYDROXYZINE HYDROCHLORIDE 10 MG/1
10 TABLET, FILM COATED ORAL EVERY 6 HOURS PRN
Status: DISCONTINUED | OUTPATIENT
Start: 2021-08-28 | End: 2021-08-29 | Stop reason: HOSPADM

## 2021-08-28 RX ORDER — OXYCODONE HYDROCHLORIDE 5 MG/1
5 TABLET ORAL EVERY 4 HOURS PRN
Status: DISCONTINUED | OUTPATIENT
Start: 2021-08-28 | End: 2021-08-28 | Stop reason: HOSPADM

## 2021-08-28 RX ORDER — HYDROMORPHONE HCL IN WATER/PF 6 MG/30 ML
0.4 PATIENT CONTROLLED ANALGESIA SYRINGE INTRAVENOUS EVERY 5 MIN PRN
Status: DISCONTINUED | OUTPATIENT
Start: 2021-08-28 | End: 2021-08-28 | Stop reason: HOSPADM

## 2021-08-28 RX ORDER — ACETAMINOPHEN 325 MG/1
975 TABLET ORAL EVERY 8 HOURS
Status: DISCONTINUED | OUTPATIENT
Start: 2021-08-28 | End: 2021-08-29 | Stop reason: HOSPADM

## 2021-08-28 RX ORDER — LIDOCAINE 40 MG/G
CREAM TOPICAL
Status: DISCONTINUED | OUTPATIENT
Start: 2021-08-28 | End: 2021-08-29 | Stop reason: HOSPADM

## 2021-08-28 RX ORDER — PROPOFOL 10 MG/ML
INJECTION, EMULSION INTRAVENOUS PRN
Status: DISCONTINUED | OUTPATIENT
Start: 2021-08-28 | End: 2021-08-28

## 2021-08-28 RX ADMIN — FENTANYL CITRATE 50 MCG: 50 INJECTION, SOLUTION INTRAMUSCULAR; INTRAVENOUS at 11:52

## 2021-08-28 RX ADMIN — Medication 1 LOZENGE: at 18:37

## 2021-08-28 RX ADMIN — Medication 5 MG: at 11:01

## 2021-08-28 RX ADMIN — ONDANSETRON 4 MG: 2 INJECTION INTRAMUSCULAR; INTRAVENOUS at 11:05

## 2021-08-28 RX ADMIN — KETOROLAC TROMETHAMINE: 30 INJECTION, SOLUTION INTRAMUSCULAR; INTRAVENOUS at 11:18

## 2021-08-28 RX ADMIN — FENTANYL CITRATE 50 MCG: 50 INJECTION, SOLUTION INTRAMUSCULAR; INTRAVENOUS at 12:25

## 2021-08-28 RX ADMIN — POTASSIUM CHLORIDE 20 MEQ: 1500 TABLET, EXTENDED RELEASE ORAL at 00:16

## 2021-08-28 RX ADMIN — ACETAMINOPHEN 650 MG: 325 TABLET, FILM COATED ORAL at 04:28

## 2021-08-28 RX ADMIN — TRANEXAMIC ACID 1 G: 10 INJECTION, SOLUTION INTRAVENOUS at 09:24

## 2021-08-28 RX ADMIN — FENTANYL CITRATE 50 MCG: 50 INJECTION, SOLUTION INTRAMUSCULAR; INTRAVENOUS at 09:11

## 2021-08-28 RX ADMIN — Medication 10 MG: at 09:31

## 2021-08-28 RX ADMIN — Medication 1 LOZENGE: at 13:42

## 2021-08-28 RX ADMIN — CEFAZOLIN SODIUM 2 G: 2 INJECTION, SOLUTION INTRAVENOUS at 09:08

## 2021-08-28 RX ADMIN — ACETAMINOPHEN 975 MG: 325 TABLET, FILM COATED ORAL at 13:42

## 2021-08-28 RX ADMIN — SODIUM CHLORIDE, POTASSIUM CHLORIDE, SODIUM LACTATE AND CALCIUM CHLORIDE: 600; 310; 30; 20 INJECTION, SOLUTION INTRAVENOUS at 13:30

## 2021-08-28 RX ADMIN — ACETAMINOPHEN 975 MG: 325 TABLET, FILM COATED ORAL at 21:20

## 2021-08-28 RX ADMIN — SODIUM CHLORIDE, POTASSIUM CHLORIDE, SODIUM LACTATE AND CALCIUM CHLORIDE: 600; 310; 30; 20 INJECTION, SOLUTION INTRAVENOUS at 09:02

## 2021-08-28 RX ADMIN — PROPOFOL 200 MG: 10 INJECTION, EMULSION INTRAVENOUS at 09:11

## 2021-08-28 RX ADMIN — CEFAZOLIN 1 G: 1 INJECTION, POWDER, FOR SOLUTION INTRAMUSCULAR; INTRAVENOUS at 16:54

## 2021-08-28 RX ADMIN — TRANEXAMIC ACID 1 G: 10 INJECTION, SOLUTION INTRAVENOUS at 11:03

## 2021-08-28 RX ADMIN — SODIUM CHLORIDE, POTASSIUM CHLORIDE, SODIUM LACTATE AND CALCIUM CHLORIDE: 600; 310; 30; 20 INJECTION, SOLUTION INTRAVENOUS at 10:52

## 2021-08-28 RX ADMIN — SODIUM CHLORIDE: 9 INJECTION, SOLUTION INTRAVENOUS at 04:40

## 2021-08-28 RX ADMIN — DEXAMETHASONE SODIUM PHOSPHATE 4 MG: 4 INJECTION, SOLUTION INTRA-ARTICULAR; INTRALESIONAL; INTRAMUSCULAR; INTRAVENOUS; SOFT TISSUE at 09:28

## 2021-08-28 RX ADMIN — Medication 5 MG: at 10:32

## 2021-08-28 RX ADMIN — HYDROMORPHONE HYDROCHLORIDE 0.5 MG: 1 INJECTION, SOLUTION INTRAMUSCULAR; INTRAVENOUS; SUBCUTANEOUS at 09:38

## 2021-08-28 RX ADMIN — LIDOCAINE HYDROCHLORIDE 100 MG: 20 INJECTION, SOLUTION INFILTRATION; PERINEURAL at 09:11

## 2021-08-28 RX ADMIN — Medication 5 MG: at 11:04

## 2021-08-28 RX ADMIN — HYDROMORPHONE HYDROCHLORIDE 0.5 MG: 1 INJECTION, SOLUTION INTRAMUSCULAR; INTRAVENOUS; SUBCUTANEOUS at 10:15

## 2021-08-28 RX ADMIN — FENTANYL CITRATE 50 MCG: 50 INJECTION, SOLUTION INTRAMUSCULAR; INTRAVENOUS at 09:46

## 2021-08-28 ASSESSMENT — ACTIVITIES OF DAILY LIVING (ADL)
ADLS_ACUITY_SCORE: 15

## 2021-08-28 NOTE — PROGRESS NOTES
"[facility]  SPIRITUAL ASSESSMENT Progress Note  [facility]     REFERRAL SOURCE: Admission Request    Introduced [facility] to patient who declined a visit indicating \"I'm doing fine.\"     PLAN: [facility] remains available for support if requested.    Melvi Miller  Associate   Pager [phone]  Phone [phone]    "

## 2021-08-28 NOTE — ANESTHESIA PROCEDURE NOTES
Airway       Patient location during procedure: OR       Procedure Start/Stop Times: 8/28/2021 9:14 AM  Staff -        Anesthesiologist:  Bekah Ramirez MD       CRNA: Keren Jj APRN CRNA       Performed By: CRNAIndications and Patient Condition       Indications for airway management: morelia-procedural       Induction type:intravenous       Mask difficulty assessment: 0 - not attempted    Final Airway Details       Final airway type: supraglottic airway    Supraglottic Airway Details        Type: LMA       Brand: Ambu AuraGain       LMA size: 4    Post intubation assessment        Placement verified by: capnometry, equal breath sounds and chest rise        Number of attempts at approach: 1       Number of other approaches attempted: 0       Secured with: pink tape       Ease of procedure: easy       Dentition: Intact

## 2021-08-28 NOTE — PLAN OF CARE
A&O x4. PRN tylenol given for pain. Bedrest. NPO @ midnight. IV fluids 100 mL/hr. Voiding w/ purewick. Zofran given timed once; resolved nausea. K+ recheck at 2300 was 3.1; gave one K+ replacement. Recheck for K+ @0430 was 3.6. Plan for OR @ 0920; pickup in bed at 0800.

## 2021-08-28 NOTE — CONSULTS
[facility]    Orthopedics Consultation    Date of Admission:  8/27/2021    Assessment & Plan   Lorna Yanez is a 70 year old female who was admitted on 8/27/2021. I was asked to see the patient for right hip pain.  She has a right displaced femoral neck fracture and osteoporosis.    I had a long discussion with the patient regarding her injury and potential surgical and nonsurgical options.  She is a very active 70-year-old and she walks without any assistive devices.  I think she is a very poor candidate for nonsurgical treatment due to her activity level and age. I am recommending a right cemented total hip arthroplasty.  We discussed the risks of surgery.  Her most significant risks include infection, intraoperative fracture, postoperative fracture, dislocation, implant failure, blood clots, wound healing problems, leg length discrepancy, and anesthesia related complications.  After discussing these risks she elected to undergo a right total hip arthroplasty.    She will be n.p.o. and preoperative antibiotics and TXA have been ordered.  Her hemoglobin is 12.2, we will continue to monitor blood loss intraoperatively and postoperatively.  She is Covid negative.      Eladio Varma MD    Code Status    Full Code    Reason for Consult   Reason for consult: Right hip pain    Primary Care Physician   Aniya Noble    History of Present Illness   Lorna Yanez is a 70 year old female who presents with a chief complaint of right hip pain.  Patient reports that she was walking on the sidewalk when she fell over a pipe yesterday afternoon.  She did not hit her head or lose consciousness.  She fell directly onto the right hip and right elbow.  She was unable to bear weight on the right leg.  She was helped up by some bystanders and she was brought to the emergency department where she was found to have a right displaced femoral neck fracture. She is a fairly healthy patient with only  "history of hypertension and peripheral neuropathy.  It is unclear why she has peripheral neuropathy as she does not have diabetes.  She states that her neuropathy began slightly after her lumbar spine surgery.  She also had a previous total knee arthroplasty done at [facility], I have reviewed those images and she has no substantial stem that would cause problems with our total hip stem.  She complains of pain with movement of the right hip.  She denies any new numbness or tingling.  She is not on any anticoagulants.      MEDS:   No current outpatient medications on file.       PAST MEDICAL HISTORY: No past medical history on file.    PAST SURGICAL HISTORY: No past surgical history on file.    FAMILY HISTORY: No family history on file.    SOCIAL HISTORY:   Social History     Tobacco Use     Smoking status: Not on file   Substance Use Topics     Alcohol use: Not on file       ALLERGIES:    Allergies   Allergen Reactions     Codeine Anxiety and Nausea and Vomiting     Rosuvastatin Other (See Comments)     Per H&P  Throat felt swollen.       Amlodipine Other (See Comments)     Losartan Other (See Comments)     \"subjective facial swelling not appreciated on exam\" per H&P  Paresthesias, subjective facial swelling not appreciated on exam       ROS:  10 point ROS neg other than the symptoms noted above in the HPI.    Physical Exam   Temp: 98.5  F (36.9  C) Temp src: Oral BP: (!) 145/70 Pulse: 75   Resp: 16 SpO2: 95 % O2 Device: None (Room air)    Vital Signs with Ranges  Temp:  [97.8  F (36.6  C)-98.5  F (36.9  C)] 98.5  F (36.9  C)  Pulse:  [75-98] 75  Resp:  [16-18] 16  BP: (123-170)/(66-95) 145/70  SpO2:  [93 %-98 %] 95 %  140 lbs 0 oz    Constitutional: Pleasant, alert, appropriate, following commands.  HEENT: Head atraumatic normocephalic. Pupils equal round and reactive to light.  Respiratory: Unlabored breathing no audible wheeze  Cardiovascular: Regular rate and rhythm  GI: Abdomen soft nontender " nondistended.  Lymph/Hematologic: No lymphadenopathy in areas examined  Genitourinary:  No fletcher  Skin: No rashes, no cyanosis, no edema.  Musculoskeletal: Focused examination of the right lower extremity demonstrates tenderness to palpation over the greater trochanter.  She has severe pain with logroll.  She has sensation intact light touch distally but this is decreased globally in the foot.  This is her baseline peripheral neuropathy.  She is able to flex and extend at the toes and the ankle.  She has a 2+ DP and PT pulses.  She has good capillary refill less than 2 seconds.  She has no skin lesions over the hip.  Neurologic: normal without focal findings, mental status, speech normal, alert and oriented x iii, ADÁN, reflexes normal and symmetric      Data   Results for orders placed or performed during the hospital encounter of 08/27/21 (from the past 24 hour(s))   CBC with platelets differential *Canceled*    Narrative    The following orders were created for panel order CBC with platelets differential.  Procedure                               Abnormality         Status                     ---------                               -----------         ------                     CBC with platelets and d...[[id number]]                                                   Please view results for these tests on the individual orders.   ABO/Rh type and screen *Canceled*    Narrative    The following orders were created for panel order ABO/Rh type and screen.  Procedure                               Abnormality         Status                     ---------                               -----------         ------                     Adult Type and Screen[[id number]]                                                         Please view results for these tests on the individual orders.   Basic metabolic panel   Result Value Ref Range    Sodium 131 (L) 133 - 144 mmol/L    Potassium 3.1 (L) 3.4 - 5.3 mmol/L    Chloride 98 94 - 109  mmol/L    Carbon Dioxide (CO2) 22 20 - 32 mmol/L    Anion Gap 11 3 - 14 mmol/L    Urea Nitrogen 16 7 - 30 mg/dL    Creatinine 0.54 0.52 - 1.04 mg/dL    Calcium 9.2 8.5 - 10.1 mg/dL    Glucose 210 (H) 70 - 99 mg/dL    GFR Estimate >90 >60 mL/min/1.73m2   XR Pelvis and Hip Right 2 Views    Narrative    PELVIS AND HIP RIGHT TWO VIEWS   8/27/2021 2:29 PM     HISTORY: Pain.    COMPARISON: None.      Impression    IMPRESSION: Displaced transverse fracture right femoral neck with  typical varus angulation at the fracture site. Mild right hip  degenerative changes.    RICHARD MCMAHAN MD         SYSTEM ID:  [id number]   XR Chest 1 View    Narrative    CHEST ONE VIEW  8/27/2021 2:29 PM     HISTORY: Question hip fracture;  get chest x-ray if hip fracture.    COMPARISON: None.      Impression    IMPRESSION: No acute disease.    EARL GATES MD         SYSTEM ID:  [id number]   EKG 12-lead, tracing only   Result Value Ref Range    Systolic Blood Pressure  mmHg    Diastolic Blood Pressure  mmHg    Ventricular Rate 92 BPM    Atrial Rate 92 BPM    ME Interval 216 ms    QRS Duration 88 ms     ms    QTc 472 ms    P Axis 66 degrees    R AXIS -10 degrees    T Axis 61 degrees    Interpretation ECG       Sinus rhythm with sinus arrhythmia with 1st degree A-V block  Cannot rule out Anterior infarct , age undetermined  Abnormal ECG  No previous ECGs available  Confirmed by GENERATED REPORT, COMPUTER ([id number]),  JUAN BERG ([id number]) on 8/27/2021 3:55:51 PM     Asymptomatic COVID-19 Virus (Coronavirus) by PCR Nasopharyngeal    Specimen: Nasopharyngeal; Swab   Result Value Ref Range    SARS CoV2 PCR Negative Negative    Narrative    Testing was performed using the patti  SARS-CoV-2 & Influenza A/B Assay on the patti  [employer]  System.  This test should be ordered for the detection of SARS-COV-2 in individuals who meet SARS-CoV-2 clinical and/or epidemiological criteria. Test performance is unknown in asymptomatic patients.  This test is for in  vitro diagnostic use under the FDA EUA for laboratories certified under CLIA to perform moderate and/or high complexity testing. This test has not been FDA cleared or approved.  A negative test does not rule out the presence of PCR inhibitors in the specimen or target RNA in concentration below the limit of detection for the assay. The possibility of a false negative should be considered if the patient's recent exposure or clinical presentation suggests COVID-19.  [facility] are certified under the Clinical Laboratory Improvement Amendments of 1988 (CLIA-88) as qualified to perform moderate and/or high complexity laboratory testing.   INR   Result Value Ref Range    INR 1.04 0.85 - 1.15   Partial thromboplastin time   Result Value Ref Range    aPTT 25 22 - 38 Seconds   Potassium   Result Value Ref Range    Potassium 3.1 (L) 3.4 - 5.3 mmol/L   CBC with Platelets & Differential    Narrative    The following orders were created for panel order CBC with Platelets & Differential.  Procedure                               Abnormality         Status                     ---------                               -----------         ------                     CBC with platelets and d...[[id number]]                      Final result                 Please view results for these tests on the individual orders.   ABO/Rh type and screen    Narrative    The following orders were created for panel order ABO/Rh type and screen.  Procedure                               Abnormality         Status                     ---------                               -----------         ------                     Adult Type and Screen[[id number]]                            Edited Result - FINAL        Please view results for these tests on the individual orders.   CBC with platelets and differential   Result Value Ref Range    WBC Count 7.7 4.0 - 11.0 10e3/uL    RBC Count 4.41 3.80 - 5.20 10e6/uL    Hemoglobin 12.2 11.7 - 15.7 g/dL     Hematocrit 35.9 35.0 - 47.0 %    MCV 81 78 - 100 fL    MCH 27.7 26.5 - 33.0 pg    MCHC 34.0 31.5 - 36.5 g/dL    RDW 13.0 10.0 - 15.0 %    Platelet Count 232 150 - 450 10e3/uL    % Neutrophils 81 %    % Lymphocytes 12 %    % Monocytes 6 %    % Eosinophils 0 %    % Basophils 0 %    % Immature Granulocytes 1 %    NRBCs per 100 WBC 0 <1 /100    Absolute Neutrophils 6.3 1.6 - 8.3 10e3/uL    Absolute Lymphocytes 0.9 0.8 - 5.3 10e3/uL    Absolute Monocytes 0.4 0.0 - 1.3 10e3/uL    Absolute Eosinophils 0.0 0.0 - 0.7 10e3/uL    Absolute Basophils 0.0 0.0 - 0.2 10e3/uL    Absolute Immature Granulocytes 0.0 <=0.0 10e3/uL    Absolute NRBCs 0.0 10e3/uL   Adult Type and Screen   Result Value Ref Range    ABO/RH(D) A NEG     Antibody Screen Negative Negative    SPECIMEN EXPIRATION DATE [id number]    Basic metabolic panel   Result Value Ref Range    Sodium 132 (L) 133 - 144 mmol/L    Potassium 3.6 3.4 - 5.3 mmol/L    Chloride 100 94 - 109 mmol/L    Carbon Dioxide (CO2) 30 20 - 32 mmol/L    Anion Gap 2 (L) 3 - 14 mmol/L    Urea Nitrogen 12 7 - 30 mg/dL    Creatinine 0.48 (L) 0.52 - 1.04 mg/dL    Calcium 8.6 8.5 - 10.1 mg/dL    Glucose 117 (H) 70 - 99 mg/dL    GFR Estimate >90 >60 mL/min/1.73m2   CBC with platelets   Result Value Ref Range    WBC Count 5.9 4.0 - 11.0 10e3/uL    RBC Count 4.27 3.80 - 5.20 10e6/uL    Hemoglobin 11.9 11.7 - 15.7 g/dL    Hematocrit 35.1 35.0 - 47.0 %    MCV 82 78 - 100 fL    MCH 27.9 26.5 - 33.0 pg    MCHC 33.9 31.5 - 36.5 g/dL    RDW 13.1 10.0 - 15.0 %    Platelet Count 211 150 - 450 10e3/uL   Potassium   Result Value Ref Range    Potassium 3.6 3.4 - 5.3 mmol/L

## 2021-08-28 NOTE — OP NOTE
[facility]   Operative Note    Pre-operative diagnosis:  Right displaced femoral neck fracture   Post-operative diagnosis  Same   Procedure:  Cemented right total hip arthroplasty   Surgeon(s): Surgeon(s) and Role:     * Eladio Varma MD - Primary     * Zara Miller PA-MARY - Assisting, the PA was present for the entirety of the case and was essential for patient positioning, soft tissue retraction, wound closure, bandage placement, and patient transport.   Estimated blood loss: 300 mL    Specimens: * No specimens in log *   Findings:  Right displaced femoral neck fracture     Indications: This is a 70-year-old female who was seen in our emergency department and was found to have a right displaced femoral neck fracture after a ground-level fall on the sidewalk.  She was admitted to the hospitalist service for preoperative optimization.  She is a community ambulator who walks without any assistive devices and has minimal medical problems.  Her medical problems include hypertension and peripheral neuropathy.  She has had multiple orthopedic surgeries in the past.  I had a long discussion with the patient regarding her right hip fracture and we discussed potential surgical treatment options and nonsurgical treatment options.  She would not do well with nonsurgical treatment as she is active and relatively young.  We did discuss her surgical treatment options and her best option is a right total hip arthroplasty rather than a hemiarthroplasty.  We discussed the risks of the surgery which include infection, dislocation, fracture, wound healing problems, DVT, PE, leg length discrepancy, neurovascular injury, blood loss, need for blood transfusions, and anesthesia related complications.  After discussing these risks she elected to undergo the above-mentioned procedure.    Implants:  Emigdio Trident 2 Tritanium 50mm Acetabular Shell  6.5 mm low-profile hex screw, 25 mm length  Trident  X3 0 degree polyethylene insert  Omnifit HFx 127 degree size #6  Ceramic C taper femoral head 36 mm +7.5 mm  Simplex P bone cement    Description of procedure:   The patient was met in the preoperative area and operative site which was the right hip was signed by myself.  Preoperative antibiotics and preoperative TXA were given.  The patient was brought back to the operating room and was placed under general anesthesia on her hospital bed.  She was then transferred over to the operating room table and was placed in the left lateral decubitus position with the right hip towards the ceiling.  She was then prepped and draped in normal sterile fashion.  A timeout was then called ensuring we are operating on the correct site and the correct patient.  All staff concerns were addressed at this time.  Following the timeout a curvilinear incision was made over the lateral aspect of the hip and dissection was carried down to the IT band.  BRYAN stasis was achieved.  The IT band was then split in line with the femur.  We then placed a Charnley retractor within the IT band and placed the hip into slight internal rotation.  We then carefully mobilized the trochanteric bursa posteriorly.  We identified and tagged the piriformis tendon.  This was then divided at the insertion site of the femur.  We then identified the capsule and made an L shaped capsulotomy.  The capsule was tagged.  This is a standard posterior lateral approach.  We then dislocated the hip through the fracture site.  We then made a cleanup cut along the femoral neck.  The bone was removed.  We then used a corkscrew into the femoral head.  We then remove the femoral head without any difficulty.  There was no significant cartilage loss on the femoral head.    We then placed a Steinmann pin in the superior aspect of the acetabulum using a mallet.  We then placed retractors within the acetabulum.  We then excised the pulp and arm and the labrum.  We then started with  a 44 mm acetabular reamer and reamed medially until we reached the medial wall.  We then went up to a size 50 mm reamer with appropriate anteversion and abduction angle.  Once we had adequate cancellous bone we opened a 50 mm Emigdio Trident cup.  This was then malleted into place with appropriate version.  We then drilled and placed a 25 mm hex screw into the cup.  We then thoroughly irrigated the wound and placed a 0 degree Trident X3 polyethylene insert.  We checked to make sure that this was seated appropriately.      We then turned our attention towards the femur where we placed a femoral elevator underneath the femur and we carefully dissected any soft tissue off of the lateral portion of the neck.  We then used a box osteotome to enter the femoral canal.  We then used a canal finder and lateralized.  We then reamed distally all the way to a size 6.  We then broached all the way to a size 6.  We attempted to stay out of varus as much as possible.  We then placed a size 5 broach.  We then trialed and determined that a high offset +5 mm head felt appropriate.  We then brought in flatplate x-ray and confirmed the excellent position of our cup and determined that a size 5 stem was just slightly too small.  We then removed all the implants in the femur and broached all the way to a size 6.  This fit nice and snug.  We then trialed once again and determined we had appropriate length and offset.  We then remove the implants from the femur and thoroughly prepped the femoral canal.  We placed a cement restrictor down the femur.  We then pressurized cement after mixing.  We then placed the size 6 Omnifit HFx high offset stem into the femoral canal.  We then allowed for the cement to harden and removed any excess cement.  We once again trialed and determined that a +7.5 mm head was the appropriate length and stability.  We then opened the final ceramic implant.  We then placed the implant on the trunnion.  We then  reduced the hip and took it through range of motion and determined that the hip was nice and stable and there were no sites of bony impingement.  The leg length was also appropriate.     We then performed a 3-minute Betadine soak and then thoroughly irrigated the wound.  We then placed local anesthetic throughout the wound.  We closed the posterior capsule using a #5 Ethibond suture.  We also closed the piriformis tendon using a #5 Ethibond suture.  The short external rotators were also repaired.  We then closed the IT band using #1 Vicryl and 0 stratafix.  The subcutaneous tissue and skin were closed using 2-0 Vicryl and staples, respectively.  A sterile bandage was placed on the patient and she was transferred off of the operating room table and brought back to the postanesthesia care unit where she awoke without any difficulty.    All counts were correct at the end of the case.    Postoperative plan: Patient will be weightbearing as tolerated on the right lower extremity.  She will be on aspirin 325 twice daily for DVT prophylaxis.  She will be seen in my clinic in approximately 2 to 3 weeks for staple removal.  If she is in a TCU Nisha Vila our nurse practitioner will follow her for a clinical check and staple removal.  She will also work her up for osteoporosis as she has a bone health nurse practitioner.

## 2021-08-28 NOTE — ANESTHESIA PREPROCEDURE EVALUATION
"Anesthesia Pre-Procedure Evaluation    Patient: Lorna Yanez   MRN: [id number] : 1950        Preoperative Diagnosis: Femoral neck fracture (H) [S72.009A]   Procedure : Procedure(s):  RIGHT TOTAL HIP ARTHROPLASTY     No past medical history on file.   No past surgical history on file.   Allergies   Allergen Reactions     Codeine Anxiety and Nausea and Vomiting     Rosuvastatin Other (See Comments)     Per H&P  Throat felt swollen.       Amlodipine Other (See Comments)     Losartan Other (See Comments)     \"subjective facial swelling not appreciated on exam\" per H&P  Paresthesias, subjective facial swelling not appreciated on exam      Social History     Tobacco Use     Smoking status: Not on file   Substance Use Topics     Alcohol use: Not on file      Wt Readings from Last 1 Encounters:   21 63.5 kg (140 lb)        Anesthesia Evaluation   Pt has had prior anesthetic.     No history of anesthetic complications       ROS/MED HX  ENT/Pulmonary:    (-) sleep apnea   Neurologic:       Cardiovascular:     (+) hypertension-----    METS/Exercise Tolerance:     Hematologic:       Musculoskeletal:   (+) fracture,     GI/Hepatic:    (-) GERD   Renal/Genitourinary:       Endo:       Psychiatric/Substance Use:       Infectious Disease:       Malignancy:       Other:               OUTSIDE LABS:  CBC:   Lab Results   Component Value Date    WBC 5.9 2021    WBC 7.7 2021    HGB 11.9 2021    HGB 12.2 2021    HCT 35.1 2021    HCT 35.9 2021     2021     2021     BMP:   Lab Results   Component Value Date     (L) 2021     (L) 2021    POTASSIUM 3.6 2021    POTASSIUM 3.6 2021    CHLORIDE 100 2021    CHLORIDE 98 2021    CO2 30 2021    CO2 22 2021    BUN 12 2021    BUN 16 2021    CR 0.48 (L) 2021    CR 0.54 2021     (H) 2021     (H) 2021     COAGS:   Lab " Results   Component Value Date    PTT 25 08/27/2021    INR 1.04 08/27/2021     POC: No results found for: BGM, HCG, HCGS  HEPATIC: No results found for: ALBUMIN, PROTTOTAL, ALT, AST, GGT, ALKPHOS, BILITOTAL, BILIDIRECT, SUNG  OTHER:   Lab Results   Component Value Date    LÓPEZ 8.6 08/28/2021       Anesthesia Plan    ASA Status:  2   NPO Status:  NPO Appropriate    Anesthesia Type: Spinal.              Consents    Anesthesia Plan(s) and associated risks, benefits, and realistic alternatives discussed. Questions answered and patient/representative(s) expressed understanding.     - Discussed with:  Patient         Postoperative Care    Pain management: Multi-modal analgesia.   PONV prophylaxis: Ondansetron (or other 5HT-3), Background Propofol Infusion     Comments:                Bekah Raimrez MD, MD

## 2021-08-28 NOTE — PROGRESS NOTES
[facility]    Medicine Progress Note - Hospitalist Service       Date of Admission:  8/27/2021    Assessment & Plan           Lorna Yanez is a 70 year old female with history of hypertension, osteoarthritis, neuropathy who is admitted on 8/27/2021. She fell outside on a  grating.  An x-ray of the pelvis shows right femoral neck fracture with varus angulation.     Principal Problem:    Right femoral neck fracture, initial encounter  ? Admit as inpatient  ? Ortho consult appreciated  ? Now s/p Cemented right total hip arthroplasty, POD#0  ? Post-op care, including pain management and DVT/px as per Ortho  ? PT/OT     Active Problems:    Multiple abrasions  ? Local cares       Hypokalemia  ? K 3.1 on admission  ? Replace potassium per protocol; K today 3.6      Essential hypertension  ? Hold hydrochlorothiazide given hyponatremia  ? Hydralazine iv prn     Hyponatremia    ? Suspect hypovolemic hyponatremia, PTA hydrochlorothiazide likely contributing; also pain, stress and opioids can all contribute to ADH release and worsen hyponatremia  ? PTA hydrochlorothiazide held on admission  ? Started on mild iv hydration  ? Na 131--132  ? Recheck BMP in a.m.      Neuropathy  ? Patient reports a history of neuropathy, details are unknown  ? Started on very low-dose gabapentin on admission but she said that she could not tolerated well in the past so will discontinue Gabapentin.      Mild Ao valve stenosis    Mitral valve annular calcifications  ? She has a known murmur; follows with cardiology through [facility]        Diet: Advance Diet as Tolerated: Regular Diet Adult    DVT Prophylaxis:  mg po BID, as per Ortho  Mckinnon Catheter: Not present  Central Lines: None  Code Status: Full Code      Disposition Plan   Expected discharge: 08/31/2021   recommended to prior living arrangement once progressing well.     The patient's care was discussed with the Bedside Nurse and Patient.    Debra  Marisela Ortiz MD  Hospitalist Service  [facility]  Securely message with the [employer] Web Console (learn more here)  Text page via [facility] Paging/Directory      Clinically Significant Risk Factors Present on Admission         # Hyponatremia: Na = 132 mmol/L (Ref range: 133 - 144 mmol/L) on admission, will monitor as appropriate           ______________________________________________________________________    Interval History   Doing fine, came back from OR; awake, alert, hopes to be able to get up soon, states that she is an active person and does not like laying in bed; also, does not like taking pain meds; she is frustrated because she will spend her birthday (tomorrow) in the hospital.  - denies chest pain, no SOB, no N/V.    Data reviewed today: I reviewed all medications, new labs and imaging results over the last 24 hours. I personally reviewed no images or EKG's today.    Physical Exam   Vital Signs: Temp: 97.8  F (36.6  C) Temp src: Oral BP: 123/66 Pulse: 76   Resp: 16 SpO2: 93 % O2 Device: None (Room air)    Weight: 140 lbs 0 oz     Constitutional: Awake, alert, NAD, very pleasant  Eyes: Conjunctiva and pupils examined and normal.  HEENT: Moist mucous membranes, normal dentition.  Respiratory: Clear to auscultation bilaterally  Cardiovascular: S1S2, RRR, systolic murmurs 3/6 precordial area  GI: Soft, non-distended, non-tender, normal bowel sounds.  Lymph/Hematologic: No anterior cervical or supraclavicular adenopathy.  Skin: No rash on exposed skin.  She has a superficial abrasion overlying the right elbow.  Neurologic: awake, alert, orientedX3, no FNDs  Psychiatric: normal mood, normal affect.    Data   Recent Labs   Lab 08/28/21  0427 08/27/21  2317 08/27/21  1629 08/27/21  1410   WBC 5.9 7.7  --   --    HGB 11.9 12.2  --   --    MCV 82 81  --   --     232  --   --    INR  --   --  1.04  --    *  --   --  131*   POTASSIUM 3.6  3.6 3.1*  --  3.1*   CHLORIDE 100  --    --  98   CO2 30  --   --  22   BUN 12  --   --  16   CR 0.48*  --   --  0.54   ANIONGAP 2*  --   --  11   LÓPEZ 8.6  --   --  9.2   *  --   --  210*     Recent Results (from the past 24 hour(s))   XR Pelvis and Hip Right 2 Views    Narrative    PELVIS AND HIP RIGHT TWO VIEWS   8/27/2021 2:29 PM     HISTORY: Pain.    COMPARISON: None.      Impression    IMPRESSION: Displaced transverse fracture right femoral neck with  typical varus angulation at the fracture site. Mild right hip  degenerative changes.    RICHARD MCMAHAN MD         SYSTEM ID:  [id number]   XR Chest 1 View    Narrative    CHEST ONE VIEW  8/27/2021 2:29 PM     HISTORY: Question hip fracture;  get chest x-ray if hip fracture.    COMPARISON: None.      Impression    IMPRESSION: No acute disease.    EARL GATES MD         SYSTEM ID:  [id number]     Medications     - MEDICATION INSTRUCTIONS -       sodium chloride 100 mL/hr at 08/28/21 0440       ceFAZolin  2 g Intravenous Pre-Op/Pre-procedure x 1 dose     ceFAZolin  2 g Intravenous See Admin Instructions     gabapentin  100 mg Oral Daily     ropivacaine 200mg, ketorolac 15mg, EPINEPHrine 0.3mg, morphine 2.5mg in saline 50 mL (TV)   INTRA-ARTICULAR On Call to OR     sodium chloride (PF)  3 mL Intracatheter Q8H     sodium chloride (PF)  3 mL Intracatheter Q8H     tranexamic acid  1 g Intravenous Once     tranexamic acid  1 g Intravenous Once

## 2021-08-28 NOTE — ANESTHESIA CARE TRANSFER NOTE
Patient: Lorna Yanez    Procedure(s):  RIGHT TOTAL HIP ARTHROPLASTY    Diagnosis: Femoral neck fracture (H) [S72.009A]  Diagnosis Additional Information: No value filed.    Anesthesia Type:   Spinal     Note:    Oropharynx: oropharynx clear of all foreign objects  Level of Consciousness: awake  Oxygen Supplementation: room air    Independent Airway: airway patency satisfactory and stable  Dentition: dentition unchanged  Vital Signs Stable: post-procedure vital signs reviewed and stable  Report to RN Given: handoff report given  Patient transferred to: PACU    Handoff Report: Identifed the Patient, Identified the Reponsible Provider, Reviewed the pertinent medical history, Discussed the surgical course, Reviewed Intra-OP anesthesia mangement and issues during anesthesia, Set expectations for post-procedure period and Allowed opportunity for questions and acknowledgement of understanding      Vitals:  Vitals Value Taken Time   /84 08/28/21 1140   Temp 37.2  C (99  F) 08/28/21 1135   Pulse 96 08/28/21 1144   Resp     SpO2 92 % 08/28/21 1144   Vitals shown include unvalidated device data.    Electronically Signed By: VELIA Ramos CRNA  August 28, 2021  11:45 AM

## 2021-08-29 ENCOUNTER — APPOINTMENT (OUTPATIENT)
Dept: PHYSICAL THERAPY | Facility: CLINIC | Age: 71
DRG: 522 | End: 2021-08-29
Attending: PHYSICIAN ASSISTANT
Payer: COMMERCIAL

## 2021-08-29 VITALS
HEART RATE: 98 BPM | HEIGHT: 65 IN | DIASTOLIC BLOOD PRESSURE: 62 MMHG | SYSTOLIC BLOOD PRESSURE: 112 MMHG | TEMPERATURE: 98.8 F | OXYGEN SATURATION: 94 % | BODY MASS INDEX: 23.32 KG/M2 | WEIGHT: 140 LBS | RESPIRATION RATE: 16 BRPM

## 2021-08-29 LAB
FASTING STATUS PATIENT QL REPORTED: ABNORMAL
GLUCOSE BLD-MCNC: 133 MG/DL (ref 70–99)
HGB BLD-MCNC: 9.3 G/DL (ref 11.7–15.7)

## 2021-08-29 PROCEDURE — 36415 COLL VENOUS BLD VENIPUNCTURE: CPT | Performed by: ORTHOPAEDIC SURGERY

## 2021-08-29 PROCEDURE — 97530 THERAPEUTIC ACTIVITIES: CPT | Mod: GP | Performed by: PHYSICAL THERAPIST

## 2021-08-29 PROCEDURE — 82947 ASSAY GLUCOSE BLOOD QUANT: CPT | Performed by: ORTHOPAEDIC SURGERY

## 2021-08-29 PROCEDURE — 97161 PT EVAL LOW COMPLEX 20 MIN: CPT | Mod: GP | Performed by: PHYSICAL THERAPIST

## 2021-08-29 PROCEDURE — 99238 HOSP IP/OBS DSCHRG MGMT 30/<: CPT | Performed by: INTERNAL MEDICINE

## 2021-08-29 PROCEDURE — 999N000111 HC STATISTIC OT IP EVAL DEFER: Performed by: OCCUPATIONAL THERAPIST

## 2021-08-29 PROCEDURE — 250N000013 HC RX MED GY IP 250 OP 250 PS 637: Performed by: PHYSICIAN ASSISTANT

## 2021-08-29 PROCEDURE — 85018 HEMOGLOBIN: CPT | Performed by: PHYSICIAN ASSISTANT

## 2021-08-29 PROCEDURE — 250N000011 HC RX IP 250 OP 636: Performed by: PHYSICIAN ASSISTANT

## 2021-08-29 PROCEDURE — 97116 GAIT TRAINING THERAPY: CPT | Mod: GP | Performed by: PHYSICAL THERAPIST

## 2021-08-29 RX ORDER — ACETAMINOPHEN 325 MG/1
650 TABLET ORAL EVERY 4 HOURS PRN
Qty: 30 TABLET | Refills: 1 | Status: ON HOLD | OUTPATIENT
Start: 2021-08-31 | End: 2024-05-01

## 2021-08-29 RX ORDER — ASPIRIN 325 MG
325 TABLET, DELAYED RELEASE (ENTERIC COATED) ORAL 2 TIMES DAILY
Qty: 60 TABLET | Refills: 1 | Status: ON HOLD | OUTPATIENT
Start: 2021-08-29 | End: 2024-05-01

## 2021-08-29 RX ADMIN — ASPIRIN 325 MG: 325 TABLET, COATED ORAL at 08:23

## 2021-08-29 RX ADMIN — ACETAMINOPHEN 975 MG: 325 TABLET, FILM COATED ORAL at 05:37

## 2021-08-29 RX ADMIN — CEFAZOLIN 1 G: 1 INJECTION, POWDER, FOR SOLUTION INTRAMUSCULAR; INTRAVENOUS at 01:05

## 2021-08-29 ASSESSMENT — ACTIVITIES OF DAILY LIVING (ADL)
ADLS_ACUITY_SCORE: 15

## 2021-08-29 NOTE — PROGRESS NOTES
"   08/29/21 0900   Quick Adds   Type of Visit Initial PT Evaluation       Present no   Living Environment   People in home spouse   Current Living Arrangements apartment   Home Accessibility no concerns   Transportation Anticipated family or friend will provide   Living Environment Comments Pt lives in an apartment with her spouse. Pt has no concerns regarding with stairs. Pt reports her spouse will pick her up upon discharge and will provide 24/7 assist as needed.   Self-Care   Usual Activity Tolerance good   Current Activity Tolerance moderate   Regular Exercise No   Equipment Currently Used at Home none   Activity/Exercise/Self-Care Comment Pt reports being IND at baseline with all ADLs including cooking, cleaning, bathing, and dressing. Pt reports not using an AD at baseline. Pt does not have a FWW at home. Pt reports stairs were not difficult for her. Pt reports ambulating unlimited distances without an AD before needing a rest break. Pt still drives and does errands IND.    Disability/Function   Hearing Difficulty or Deaf no   Wear Glasses or Blind yes   Vision Management glasses   Concentrating, Remembering or Making Decisions Difficulty no   Difficulty Communicating no   Walking or Climbing Stairs Difficulty no   Doing Errands Independently Difficulty (such as shopping) yes   Errands Management Pt reports family assists with errands.   Fall history within last six months yes   Number of times patient has fallen within last six months 1   Change in Functional Status Since Onset of Current Illness/Injury yes   General Information   Onset of Illness/Injury or Date of Surgery 08/29/21   Referring Physician Zara Miller, NORRIS   Patient/Family Therapy Goals Statement (PT) \"To go home\"   Pertinent History of Current Problem (include personal factors and/or comorbidities that impact the POC) s/p R cemented TRISTAN POD#1   Existing Precautions/Restrictions fall;no hip IR;no hip ADD past " midline;90 degree hip flexion   Weight-Bearing Status - LLE full weight-bearing   Weight-Bearing Status - RLE weight-bearing as tolerated   Cognition   Orientation Status (Cognition) oriented x 3   Pain Assessment   Patient Currently in Pain Yes, see Vital Sign flowsheet  (2/10)   Integumentary/Edema   Integumentary/Edema Comments Incision on R LE with dressing intact   Posture    Posture Forward head position;Protracted shoulders   Range of Motion (ROM)   ROM Quick Adds ROM WFL;ROM deficits secondary to surgical procedure;ROM deficits secondary to pain;ROM deficits secondary to swelling;ROM deficits secondary to weakness   ROM Comment L LE ROM WFL   Strength   Manual Muscle Testing Quick Adds Able to perform L SLR;Deficits observed during functional mobility   Bed Mobility   Comment (Bed Mobility) Supne>sit w/ CGA   Transfers   Transfer Safety Comments Sit>stand w/ FWW and CGA   Gait/Stairs (Locomotion)   [place] Level (Gait) contact guard   Assistive Device (Gait) walker, front-wheeled   Distance in Feet (Required for LE Total Joints) 300'  (10' eval)   Comment (Gait/Stairs) Pt ambulated ~300' w/ FWW and CGA. Pt ambulated with decreased gait speed, downward gaze, and heavy use of BUEs on FWW. Verbal cues for upright gaze and posture, to increase step length length, and to reduce force through BUEs on FWW. Pt was steady throughout and had no LOB.   Balance   Balance Comments Pt able to sit at EOB unsupported without LOB. Pt ambulates using a FWW for added stability and support.   Sensory Examination   Sensory Perception patient reports no sensory changes   Clinical Impression   Criteria for Skilled Therapeutic Intervention yes, treatment indicated   PT Diagnosis (PT) Impaired gait   Influenced by the following impairments Increased pain; decreased activity tolerance; decreased balance; decreased strength   Functional limitations due to impairments Impaired functional mobility   Clinical Presentation  Stable/Uncomplicated   Clinical Presentation Rationale Clinical Judgement   Clinical Decision Making (Complexity) low complexity   Therapy Frequency (PT) Daily   Predicted Duration of Therapy Intervention (days/wks) 7 days   Planned Therapy Interventions (PT) balance training;bed mobility training;cryotherapy;gait training;home exercise program;patient/family education;stair training;strengthening;transfer training   Anticipated Equipment Needs at Discharge (PT) walker, standard   Risk & Benefits of therapy have been explained evaluation/treatment results reviewed;care plan/treatment goals reviewed;risks/benefits reviewed;current/potential barriers reviewed;participants voiced agreement with care plan;participants included;patient   PT Discharge Planning    PT Discharge Recommendation (DC Rec) home;home with assist   PT Rationale for DC Rec Pt is below baseline but is moving well. Anticipate by time of discharge pt will be SBA for bed mobility, functional transfers w/ FWW, and gait w/ FWW   PT Brief overview of current status  Supine>sit w/ CGA; sit>stand w/ FWW and CGA; gait w/ FWW and CGA   Total Evaluation Time   Total Evaluation Time (Minutes) 10

## 2021-08-29 NOTE — PROGRESS NOTES
Ortho  S/p Right cemented TRISTAN; POD#1    Pain controlled on Tylenol  Denies CP, SOB, fever/chills, nausea  NAD, A&O  Aquacel  Calves soft, NT  CMS intact  Hgb - 9.3    Plan:  PT/OT, WBAT  ASA for DVT prophylaxis  Discharge to home today  Follow up with Dr Varma 2 weeks    Jasmin Guerrero PA-C  7:23 AM

## 2021-08-29 NOTE — PLAN OF CARE
A&O x4. VSS on RA. Up to BR w/ 1 assist. Intake and output adequate. Pain managed with scheduled tylenol, ice, and repositioning. 1 loose stool yesterday evening. Held senna. CMS intake except baseline neuropathy. Discharge plan pending.

## 2021-08-29 NOTE — PLAN OF CARE
OT: Orders received. Chart reviewed and discussed with care team. Spoke with physical therapy who reported pt maintains hip precautions well with functional mobility and toilet transfers. Spoke with pt about lower body dressing, pt has no concerns about lower body dressing. If needed, her  is available 24/7 for A. OT not indicated as pt is (I) in I/ADLs and functional mobility. Will complete orders.

## 2021-08-29 NOTE — PROGRESS NOTES
"   08/29/21 0900   Quick Adds   Type of Visit Initial PT Evaluation       Present no   Living Environment   People in home spouse   Current Living Arrangements apartment   Home Accessibility no concerns   Transportation Anticipated family or friend will provide   Living Environment Comments Pt lives in an apartment with her spouse. Pt has no concerns regarding with stairs. Pt reports her spouse will pick her up upon discharge and will provide 24/7 assist as needed.   Self-Care   Usual Activity Tolerance good   Current Activity Tolerance moderate   Regular Exercise No   Equipment Currently Used at Home none   Activity/Exercise/Self-Care Comment Pt reports being IND at baseline with all ADLs including cooking, cleaning, bathing, and dressing. Pt reports not using an AD at baseline. Pt does not have a FWW at home. Pt reports stairs were not difficult for her. Pt reports ambulating unlimited distances without an AD before needing a rest break. Pt still drives and does errands IND.    Disability/Function   Hearing Difficulty or Deaf no   Wear Glasses or Blind yes   Vision Management glasses   Concentrating, Remembering or Making Decisions Difficulty no   Difficulty Communicating no   Walking or Climbing Stairs Difficulty no   Doing Errands Independently Difficulty (such as shopping) yes   Errands Management Pt reports family assists with errands.   Fall history within last six months yes   Number of times patient has fallen within last six months 1   Change in Functional Status Since Onset of Current Illness/Injury yes   General Information   Onset of Illness/Injury or Date of Surgery 08/29/21   Referring Physician Zara Miller, NORRIS   Patient/Family Therapy Goals Statement (PT) \"To go home\"   Pertinent History of Current Problem (include personal factors and/or comorbidities that impact the POC) s/p R cemented TRISTAN POD#1   Existing Precautions/Restrictions fall;no hip IR;no hip ADD past " midline;90 degree hip flexion   Weight-Bearing Status - LLE full weight-bearing   Weight-Bearing Status - RLE weight-bearing as tolerated   Cognition   Orientation Status (Cognition) oriented x 3   Pain Assessment   Patient Currently in Pain Yes, see Vital Sign flowsheet  (2/10)   Integumentary/Edema   Integumentary/Edema Comments Incision on R LE with dressing intact   Posture    Posture Forward head position;Protracted shoulders   Range of Motion (ROM)   ROM Quick Adds ROM WFL;ROM deficits secondary to surgical procedure;ROM deficits secondary to pain;ROM deficits secondary to swelling;ROM deficits secondary to weakness   ROM Comment L LE ROM WFL   Strength   Manual Muscle Testing Quick Adds Able to perform L SLR;Deficits observed during functional mobility   Bed Mobility   Comment (Bed Mobility) Supne>sit w/ CGA   Transfers   Transfer Safety Comments Sit>stand w/ FWW and CGA   Gait/Stairs (Locomotion)   [place] Level (Gait) contact guard   Assistive Device (Gait) walker, front-wheeled   Distance in Feet (Required for LE Total Joints) 300'  (10' eval)   Comment (Gait/Stairs) Pt ambulated ~300' w/ FWW and CGA. Pt ambulated with decreased gait speed, downward gaze, and heavy use of BUEs on FWW. Verbal cues for upright gaze and posture, to increase step length length, and to reduce force through BUEs on FWW. Pt was steady throughout and had no LOB.   Balance   Balance Comments Pt able to sit at EOB unsupported without LOB. Pt ambulates using a FWW for added stability and support.   Sensory Examination   Sensory Perception patient reports no sensory changes   Clinical Impression   Criteria for Skilled Therapeutic Intervention yes, treatment indicated   PT Diagnosis (PT) Impaired gait   Influenced by the following impairments Increased pain; decreased activity tolerance; decreased balance; decreased strength   Functional limitations due to impairments Impaired functional mobility   Clinical Presentation  Stable/Uncomplicated   Clinical Presentation Rationale Clinical Judgement   Clinical Decision Making (Complexity) low complexity   Therapy Frequency (PT) Daily   Predicted Duration of Therapy Intervention (days/wks) 7 days   Planned Therapy Interventions (PT) balance training;bed mobility training;cryotherapy;gait training;home exercise program;patient/family education;stair training;strengthening;transfer training   Anticipated Equipment Needs at Discharge (PT) walker, standard   Risk & Benefits of therapy have been explained evaluation/treatment results reviewed;care plan/treatment goals reviewed;risks/benefits reviewed;current/potential barriers reviewed;participants voiced agreement with care plan;participants included;patient   PT Discharge Planning    PT Discharge Recommendation (DC Rec) home;home with assist   PT Rationale for DC Rec Pt is below baseline but is moving well. Anticipate by time of discharge pt will be SBA for bed mobility, functional transfers w/ FWW, and gait w/ FWW   PT Brief overview of current status  Supine>sit w/ CGA; sit>stand w/ FWW and CGA; gait w/ FWW and CGA   Total Evaluation Time   Total Evaluation Time (Minutes) 10

## 2021-08-29 NOTE — PLAN OF CARE
.Patient vital signs are at baseline: Yes  Patient able to ambulate as they were prior to admission or with assist devices provided by therapies during their stay:  Yes  Patient MUST void prior to discharge:  Yes  Patient able to tolerate oral intake:  Yes  Pain has adequate pain control using Oral analgesics:  Yes  Pt. A&o, vss, up with sba, voiding in b.r, denied any pain, dressing CDI, small drainage noted, discharge instruction reviewed, discharge medications given, belongings returned and pt. Discharge to home with spouse at 13:05.

## 2021-08-29 NOTE — PLAN OF CARE
Pt arrived back to unit at 1315 from PACU, pt A&Ox4, VSS, CMS intact with baseline neuropathy, Aquacel dressing C,D,I, tolerating regular diet, IVF, capno WNL,O2 RA, schedule tylenol for pain management, pt declines narcotics at this time, up with assist of one and walker to BR, voiding.

## 2021-08-29 NOTE — PLAN OF CARE
Physical Therapy Discharge Summary    Reason for therapy discharge:    Discharged to home.    Progress towards therapy goal(s). See goals on Care Plan in [facility] electronic health record for goal details.  Goals met    Therapy recommendation(s):    Continue home exercise program.

## 2021-08-29 NOTE — DISCHARGE SUMMARY
[facility]  Hospitalist Discharge Summary      Date of Admission:  8/27/2021  Date of Discharge:  8/29/2021  Discharging Provider: Debra Ortiz MD      Discharge Diagnoses   Right femoral neck fracture s/p cemented right total hip arthroplasty  Mechanical fall  Acute blood loss anemia  Hypokalemia- replaced  Hyponatremia- improved  HTN  Neuropathy      Follow-ups Needed After Discharge   Follow-up Appointments     Follow Up Care      Follow up with PMD within 7-10 days. Recommend check BMP and hemoglobin.        Follow-up with your Surgeon Team in two weeks for wound check.         Physical Therapy Instructions      Begin physical therapy within one week following surgery.           Unresulted Labs Ordered in the Past 30 Days of this Admission     Date and Time Order Name Status Description    8/27/2021  4:37 PM 25 Hydroxyvitamin D2 and D3 In process       These results will be followed up by PMD    Discharge Disposition   Discharged to home  Condition at discharge: Good    Hospital Course       Lorna Yanez is a 70 year old female with history of hypertension, osteoarthritis, neuropathy who is admitted on 8/27/2021. She fell outside on a  grating.  An x-ray of the pelvis shows right femoral neck fracture with varus angulation; for a detailed HPI- please refer to H&P done by Dr Luz Aguayo on 08/27/2021.      Right femoral neck fracture, initial encounter  ? Ortho consult appreciated  ? s/p cemented right total hip arthroplasty on 08/28/2021  ? Progressed well post op, pain controlled only with Tylenol prn  ? Started on  mg po BID for DVT/px as per Ortho  ? Was seen by PT/OT; she was cleared for discharge by Ortho  ? Follow up with Ortho in 2 weeks       Hypokalemia- corrected   ? K 3.1 on admission, replaced as per protocol, repeat K 3.6      Acute blood loss anemia  ? Hb down from 12.2--11.9--9.3  ? EBL 300cc  ? Repeat Hb in PMD office      Essential  hypertension  ? PTA hydrochlorothiazide held on admission given hyponatremia but we'll resume after discharge   ? Follow up with PMD or Cardiology      Hyponatremia - improved  ? Suspect hypovolemic hyponatremia, PTA hydrochlorothiazide likely contributing; also pain, stress and opioids can all contribute to ADH release and worsen hyponatremia  ? PTA hydrochlorothiazide held on admission  ? Started on mild iv hydration  ? Na 131--132  ? Recheck BMP in PMD office      Neuropathy            Patient reports a history of neuropathy; said that she could not tolerated well in the past.       Mild Ao valve stenosis    Mitral valve annular calcifications  ? She has a known murmur; [facility]        Consultations This Hospital Stay   ORTHOPEDIC SURGERY IP CONSULT  PHYSICAL THERAPY ADULT IP CONSULT  OCCUPATIONAL THERAPY ADULT IP CONSULT    Code Status   Full Code    Time Spent on this Encounter   I, Debra Ortiz MD, personally saw the patient today and spent less than or equal to 30 minutes discharging this patient.       Debra Ortiz MD  [place] [address] [facility]  [address] [place] [address]  Phone: [phone]  ______________________________________________________________________    Physical Exam   Vital Signs: Temp: 98.8  F (37.1  C) Temp src: Oral BP: 112/62 Pulse: 98   Resp: 16 SpO2: 94 % O2 Device: None (Room air) Oxygen Delivery: 2 LPM  Weight: 140 lbs 0 oz     Constitutional: Awake, alert, NAD, very pleasant  Eyes: Conjunctiva and pupils examined and normal.  HEENT: Moist mucous membranes, normal dentition.  Respiratory: Clear to auscultation bilaterally  Cardiovascular: S1S2, RRR, systolic murmurs 3/6 precordial area  GI: Soft, non-distended, non-tender, normal bowel sounds.  Lymph/Hematologic: No anterior cervical or supraclavicular adenopathy.  Skin: No rash on exposed skin.  She has a superficial abrasion overlying the right  "elbow.  Neurologic: awake, alert, orientedX3, no FNDs  Psychiatric: normal mood, normal affect.    Primary Care Physician   Aniya Noble    Discharge Orders      Reason for your hospital stay    Right femoral neck fracture     When to call - Contact Surgeon Team    You may experience symptoms that require follow-up before your scheduled appointment. Refer to the \"Stoplight Tool\" for instructions on when to contact your Surgeon Team if you are concerned about pain control, blood clots, constipation, or if you are unable to urinate.     When to call - Reach out to Urgent Care    If you are not able to reach your Surgeon Team and you need immediate care, go to the Orthopedic Walk-in Clinic or Urgent Care at your Surgeon's office.  Do NOT go to the Emergency Room unless you have shortness of breath, chest pain, or other signs of a medical emergency.     When to call - Reasons to Call 911    Call 911 immediately if you experience sudden-onset chest pain, arm weakness/numbness, slurred speech, or shortness of breath     Discharge Instruction - Breathing exercises    Perform breathing exercises using your Incentive Spirometer 10 times per hour while awake for 2 weeks.     Symptoms - Fever Management    A low grade fever can be expected after surgery.  Use acetaminophen (TYLENOL) as needed for fever management.  Contact your Surgeon Team if you have a fever greater than 101.5 F, chills, and/or night sweats.     Symptoms - Constipation management    Constipation (hard, dry bowel movements) is expected after surgery due to the combination of being less active, the anesthetic, and the opioid pain medication.  You can do the following to help reduce constipation:  ~  FLUIDS:  Drink clear liquids (water or Gatorade), or juice (apple/prune).  ~  DIET:  Eat a fiber rich diet.    ~  ACTIVITY:  Get up and move around several times a day.  Increase your activity as you are able.  MEDICATIONS:  Reduce the risk of constipation by " starting medications before you are constipated.  You can take Miralax   (1 packet as directed) and/or a stool softener (Senokot 1-2 tablets 1-2 times a day).  If you already have constipation and these medications are not working, you can get magnesium citrate and use as directed.  If you continue to have constipation you can try an over the counter suppository or enema.  Call your Surgeon Team if it has been greater than 3 days since your last bowel movement.     Symptoms - Reduced Urine Output    Changes in the amount of fluids you drank before and after surgery may result in problems urinating.  It is important to stay well-hydrated after surgery and drink plenty of water. If it has been greater than 8 hours since you have urinated despite drinking plenty of water, call your Surgeon Team.     Activity - Exercises to prevent blood clots    Unless otherwise directed by your Surgeon team, perform the following exercises at least three times per day for the first four weeks after surgery to prevent blood clots in your legs: 1) Point and flex your feet (Ankle Pumps), 2) Move your ankle around in big circles, 3) Wiggle your toes, 4) Walk, even for short distances, several times a day, will help decrease the risk of blood clots.     Comfort and Pain Management - Pain after Surgery    Pain after surgery is normal and expected.  You will have some amount of pain for several weeks after surgery.  Your pain will improve with time.  There are several things you can do to help reduce your pain including: rest, ice, elevation, and using pain medications as needed. Contact your Surgeon Team if you have pain that persists or worsens after surgery despite rest, ice, elevation, and taking your medication(s) as prescribed. Contact your Surgeon Team if you have new numbness, tingling, or weakness in your operative extremity.     Comfort and Pain Management - Swelling after Surgery    Swelling and/or bruising of the surgical  extremity is common and may persist for several months after surgery. In addition to frequent icing and elevation, gentle compressive support with an ACE wrap or tubigrip may help with swelling. Apply compression regularly, removing at least twice daily to perform skin checks. Contact your Surgeon Team if your swelling increases and is NOT associated with an increase in your activity level, or if your swelling increases and is associated with redness and pain.     Comfort and Pain Management - Cold therapy    Ice can be used to control swelling and discomfort after surgery. Place a thin towel over your operative site and apply the ice pack overtop. Leave ice pack in place for 20 minutes, then remove for 20 minutes. Repeat this 20 minutes on/20 minutes off routine as often as tolerated.     Medication Instructions - Acetaminophen (TYLENOL) Instructions    You were discharged with acetaminophen (TYLENOL) for pain management after surgery. Acetaminophen most effectively manages pain symptoms when it is taken on a schedule without missing doses (every four, six, or eight hours). Your Provider will prescribe a safe daily dose between 3000 - 4000 mg.  Do NOT exceed this daily dose. Most patients use acetaminophen for pain control for the first four weeks after surgery.  You can wean from this medication as your pain decreases.     Medication Instructions - NSAID Instructions    You were discharged with an anti-inflammatory medication for pain management to use in combination with acetaminophen (TYLENOL) and the narcotic pain medication.  Take this medication exactly as directed.  You should only take one anti-inflammatory at a time.  Some common anti-inflammatories include: ibuprofen (ADVIL, MOTRIN), naproxen (ALEVE, NAPROSYN), celecoxib (CELEBREX), meloxicam (MOBIC), ketorolac (TORADOL).  Take this medication with food and water.     Medication instructions -  Anticoagulation - aspirin    Take the aspirin as prescribed  "for a total of four weeks after surgery.  This is given to help minimize your risk of blood clot.     Comfort and Pain Management - LOWER Extremity Elevation    Swelling is expected for several months after surgery. This type of swelling is usually associated with gravity and activity, and can be improved with elevation.   The best way to do this is to get your \"toes above your nose\" by laying down and placing several pillows lengthwise under your calf and heel. When elevating your leg keep your knee completely straight. Perform this elevation as often as possible especially for the first two weeks after surgery.     Medication Instructions - Opioids - Tapering Instructions    In the first three days following surgery, your symptoms may warrant use of the narcotic pain medication every four to six hours as prescribed. This is normal. As your pain symptoms improve, focus your efforts on decreasing (tapering) use of narcotic medications. The most successful tapering strategy is to first, decrease the number of tablets you take every 4-6 hours to the minimum prescribed. Then, increase the amount of time between doses.  For example:  First, taper to   or 1 tablet every 4-6 hours.  Then, taper to   or 1 tablet every 6-8 hours.  Then, taper to   or 1 tablet every 8-10 hours.  Then, taper to   or 1 tablet every 10-12 hours.  Then, taper to   or 1 tablet at bedtime.  The bedtime dose can help with comfort during sleep and is typically the last dose to be discontinued after surgery.     Physical Therapy Instructions    Begin physical therapy within one week following surgery.     Activity - Total Hip Arthroplasty    Refer to the [facility] \"Your Guide to Total Joint Replacement\" for recommendations on activities and Exercises.     Return to Driving    Return to driving - Driving is NOT permitted until directed by your provider. Under no circumstance are you permitted to drive while using narcotic pain medications. "     Dressing / Wound Care - Wound    You have a clean dressing on your surgical wound. Dressing change instructions as follows: dressing will be removed at your follow-up appointment. Contact your Surgeon Team if you have increased redness, warmth around the surgical wound, and/or drainage from the surgical wound.     Dressing / Wound Care - NO Tub Bathing    Tub bathing, swimming, or any other activities that will cause your incision to be submerged in water should be avoided until allowed by your Surgeon.     Weight bearing as tolerated    Weight bearing as tolerated on your operative extremity.     No flexion past 90 degrees    No bending at waist past 90 degrees.     No adduction past midline    No crossing your operative leg.     No hyperextension    No kicking-back or lunging with operative leg.     No active abduction    No lifting your operative leg to the side.     No external rotation    No pivoting on your operative leg.     No internal rotation    No pivoting on your operative leg.     Dressing / Wound care - Shower with wound/dressing covered    You must COVER your dressing or incision with saran wrap (or any other non-permeable covering) to allow the incision to remain dry while showering.  You may shower after surgery as long as the surgical wound stays dry. Continue to cover your dressing or incision for showering until your first office visit.  You are strictly prohibited from soaking   or submerging the surgical wound underwater.     Follow Up Care    Follow up with PMD within 7-10 days. Recommend check BMP and hemoglobin.      Follow-up with your Surgeon Team in two weeks for wound check.     Damaso MARRERO    DME Documentation: Describe the reason for need to support medical necessity: Impaired gait status post hip surgery. I, the undersigned, certify that the above prescribed supplies are medically necessary for this patient and is both reasonable and necessary in reference to accepted standards of  medical practice in the treatment of this patient's condition and is not prescribed as a convenience.     [facility]    : DME Documentation: Describe the reason for need to support medical necessity: Impaired gait status post hip surgery. I, the undersigned, certify that the above prescribed supplies are medically necessary for this patient and is both reasonable and necessary in reference to accepted standards of medical practice in the treatment of this patient's condition and is not prescribed as a convenience.     Discharge Instruction - Regular Diet Adult    Return to your pre-surgery diet unless instructed otherwise       Significant Results and Procedures   Most Recent 3 CBC's:Recent Labs   Lab Test 08/29/21 0648 08/28/21 0427 08/27/21 2317   WBC  --  5.9 7.7   HGB 9.3* 11.9 12.2   MCV  --  82 81   PLT  --  211 232     Most Recent 3 BMP's:Recent Labs   Lab Test 08/29/21 0648 08/28/21 0427 08/27/21 2317 08/27/21  1410   NA  --  132*  --  131*   POTASSIUM  --  3.6  3.6 3.1* 3.1*   CHLORIDE  --  100  --  98   CO2  --  30  --  22   BUN  --  12  --  16   CR  --  0.48*  --  0.54   ANIONGAP  --  2*  --  11   LÓPEZ  --  8.6  --  9.2   * 117*  --  210*     Most Recent 2 LFT's:No lab results found.  Most Recent 3 Hemoglobins:Recent Labs   Lab Test 08/29/21 0648 08/28/21 0427 08/27/21 2317   HGB 9.3* 11.9 12.2     Most Recent TSH and T4:No lab results found.,   Results for orders placed or performed during the hospital encounter of 08/27/21   XR Pelvis and Hip Right 2 Views    Narrative    PELVIS AND HIP RIGHT TWO VIEWS   8/27/2021 2:29 PM     HISTORY: Pain.    COMPARISON: None.      Impression    IMPRESSION: Displaced transverse fracture right femoral neck with  typical varus angulation at the fracture site. Mild right hip  degenerative changes.    RICHARD MCMAHAN MD         SYSTEM ID:  [id number]   XR Chest 1 View    Narrative    CHEST ONE VIEW  8/27/2021 2:29 PM     HISTORY: Question hip fracture;  get  chest x-ray if hip fracture.    COMPARISON: None.      Impression    IMPRESSION: No acute disease.    EARL GATES MD         SYSTEM ID:  [id number]   XR Pelvis Port 1/2 Views    Narrative    Examination:  XR PELVIS PORT 1/2 VIEWS    Date:  8/28/2021 10:35 AM     Clinical Information: Intraoperative evaluation.    Comparison: 8/27/2020.      Impression    Impression:    1.  Partially completed right total hip arthroplasty. Components  appear normally aligned. No evidence of immediate complication or  fracture. Please see the procedure report for full details.    ADRIAN PEREIRA MD         SYSTEM ID:  [id number]   XR Pelvis w Hip Port Right 1 View    Narrative    EXAM: XR PELVIS AD HIP PORTABLE RIGHT 1 VIEW  LOCATION: [facility]  DATE/TIME: 8/28/2021 11:52 AM    INDICATION: Postoperative evaluation.  COMPARISON: 8/28/2021 at 1032 hours.      Impression    IMPRESSION: Completed right total hip arthroplasty. Normal joint alignment. No evidence of immediate hardware complication or periprosthetic fracture. Components appear well seated. There is air in the joint space and skin staples in place, within   expected limits.       Discharge Medications   Current Discharge Medication List      START taking these medications    Details   acetaminophen (TYLENOL) 325 MG tablet Take 2 tablets (650 mg) by mouth every 4 hours as needed for other (For optimal non-opioid multimodal pain management to improve pain control.)  Qty: 30 tablet, Refills: 1    Associated Diagnoses: Closed fracture of right hip, initial encounter (H)      aspirin (ASA) 325 MG EC tablet Take 1 tablet (325 mg) by mouth 2 times daily  Qty: 60 tablet, Refills: 1    Associated Diagnoses: Closed fracture of right hip, initial encounter (H)         CONTINUE these medications which have NOT CHANGED    Details   Ascorbic Acid (VITAMIN C) 500 MG CAPS Take 1 capsule by mouth daily      Coenzyme Q10 (COQ10 PO) Take 1 capsule (unknown dose)  "by mouth daily      fish oil-omega-3 fatty acids 1000 MG capsule Take 1 g by mouth daily       FOLIC ACID PO Take 1 tablet (unknown dose) by mouth daily      hydrochlorothiazide (HYDRODIURIL) 25 MG tablet Take 25 mg by mouth daily      Multiple Vitamins-Minerals (ZINC PO) Take 1 capsule (unknown dose) by mouth daily      VITAMIN D PO Take 1 capsule (unknown dose) by mouth daily           Allergies   Allergies   Allergen Reactions     Codeine Anxiety and Nausea and Vomiting     Rosuvastatin Other (See Comments)     Per H&P  Throat felt swollen.       Amlodipine Other (See Comments)     Losartan Other (See Comments)     \"subjective facial swelling not appreciated on exam\" per H&P  Paresthesias, subjective facial swelling not appreciated on exam     "

## 2021-08-30 ENCOUNTER — PATIENT OUTREACH (OUTPATIENT)
Dept: CARE COORDINATION | Facility: CLINIC | Age: 71
End: 2021-08-30

## 2021-08-30 DIAGNOSIS — Z71.89 OTHER SPECIFIED COUNSELING: ICD-10-CM

## 2021-08-30 NOTE — PROGRESS NOTES
Clinic Care Coordination Contact  [facility]/Voicemail       Clinical Data: Care Coordinator Outreach  Outreach attempted x 1.  Left message on patient's voicemail with call back information and requested return call.  Plan: Care Coordinator will try to reach patient again in 1-2 business days.    Tatum Almeida  Care Transitions Assistant  [facility]

## 2021-08-31 LAB
DEPRECATED CALCIDIOL+CALCIFEROL SERPL-MC: <62 UG/L (ref 20–75)
VITAMIN D2 SERPL-MCNC: <5 UG/L
VITAMIN D3 SERPL-MCNC: 57 UG/L

## 2021-08-31 NOTE — PROGRESS NOTES
Clinic Care Coordination Contact  [facility]: Post-Discharge Note  SITUATION                                                      Admission:    Admission Date: 08/27/21   Reason for Admission: Right femoral neck fracture s/p cemented right total hip arthroplasty  Discharge:   Discharge Date: 08/29/21  Discharge Diagnosis: Right femoral neck fracture s/p cemented right total hip arthroplasty    BACKGROUND                                                      70 year old female with history of hypertension, osteoarthritis, neuropathy who is admitted on 8/27/2021. She fell outside on a  grating.  An x-ray of the pelvis shows right femoral neck fracture with varus angulation; for a detailed HPI- please refer to H&P done by Dr Luz Aguayo on 08/27/2021.       Right femoral neck fracture, initial encounter  ? Ortho consult appreciated  ? s/p cemented right total hip arthroplasty on 08/28/2021  ? Progressed well post op, pain controlled only with Tylenol prn  ? Started on  mg po BID for DVT/px as per Ortho  ? Was seen by PT/OT; she was cleared for discharge by Ortho  ? Follow up with Ortho in 2 weeks       Hypokalemia- corrected   ? K 3.1 on admission, replaced as per protocol, repeat K 3.6       Acute blood loss anemia  ? Hb down from 12.2--11.9--9.3  ? EBL 300cc  ? Repeat Hb in PMD office       Essential hypertension  ? PTA hydrochlorothiazide held on admission given hyponatremia but we'll resume after discharge   ? Follow up with PMD or Cardiology      Hyponatremia - improved  ? Suspect hypovolemic hyponatremia, PTA hydrochlorothiazide likely contributing; also pain, stress and opioids can all contribute to ADH release and worsen hyponatremia  ? PTA hydrochlorothiazide held on admission  ? Started on mild iv hydration  ? Na 131--132  ? Recheck BMP in PMD office       Neuropathy            Patient reports a history of neuropathy; said that she could not tolerated well in the past.       Mild Ao  valve stenosis    Mitral valve annular calcifications  ? She has a known murmur; follows with cardiology through [facility]                 ASSESSMENT           Discharge Assessment  How are you doing now that you are home?: going good,  How are your symptoms? (Red Flag symptoms escalate to triage hotline per guidelines): Improved  Do you feel your condition is stable enough to be safe at home until your provider visit?: Yes  Does the patient have their discharge instructions? : Yes  Does the patient have questions regarding their discharge instructions? : No  Were you started on any new medications or were there changes to any of your previous medications? : No  Does the patient have all of their medications?: Yes  Do you have questions regarding any of your medications? : No  Do you have all of your needed medical supplies or equipment (DME)?  (i.e. oxygen tank, CPAP, cane, etc.):  (got a walker)  Discharge follow-up appointment scheduled within 14 calendar days? : Yes  Discharge Follow Up Appointment Date: 09/09/21  Discharge Follow Up Appointment Scheduled with?: Specialty Care Provider    Post-op ([facility] Only)  If the patient had a surgery or procedure, do they have any questions for a nurse?: Yes (see comment) (Right femoral neck fracture s/p cemented right total hip arthroplasty)    Post-op (Clinicians Only)  Fever: No  Chills: No  Eating & Drinking: eating and drinking without complaints/concerns  PO Intake: regular diet  Bowel Function: normal  Date of last BM: 08/31/21  Urinary Status: voiding without complaint/concerns        PLAN                                                      Outpatient Plan:  Follow up with PMD within 7-10 days. Recommend check BMP and hemoglobin.          Follow-up with your Surgeon Team in two weeks for wound check.         Physical Therapy Instructions      Begin physical therapy within one week following surgery.            No future appointments.      For any urgent  concerns, please contact our 24 hour nurse triage line: [phone] ([phone])         Estephania Almeida MA

## 2021-09-28 NOTE — ANESTHESIA POSTPROCEDURE EVALUATION
Patient: Lorna Yanez    Procedure(s):  RIGHT TOTAL HIP ARTHROPLASTY    Diagnosis:Femoral neck fracture (H) [S72.009A]  Diagnosis Additional Information: No value filed.    Anesthesia Type:  Spinal    Note:     Postop Pain Control: Uneventful            Sign Out: Well controlled pain   PONV: No   Neuro/Psych: Uneventful            Sign Out: Acceptable/Baseline neuro status   Airway/Respiratory: Uneventful            Sign Out: Acceptable/Baseline resp. status   CV/Hemodynamics: Uneventful            Sign Out: Acceptable CV status; No obvious hypovolemia; No obvious fluid overload   Other NRE: NONE   DID A NON-ROUTINE EVENT OCCUR? No           Last vitals:  Vitals Value Taken Time   /77 08/28/21 1240   Temp 37.2  C (99  F) 08/28/21 1135   Pulse 79 08/28/21 1240   Resp 15 08/28/21 1240   SpO2 94 % 08/28/21 1246       Electronically Signed By: Bekah Ramirez MD, MD  September 28, 2021  11:45 AM

## 2024-04-09 ENCOUNTER — TRANSFERRED RECORDS (OUTPATIENT)
Dept: HEALTH INFORMATION MANAGEMENT | Facility: CLINIC | Age: 74
End: 2024-04-09
Payer: COMMERCIAL

## 2024-04-16 ENCOUNTER — MEDICAL CORRESPONDENCE (OUTPATIENT)
Dept: HEALTH INFORMATION MANAGEMENT | Facility: CLINIC | Age: 74
End: 2024-04-16
Payer: COMMERCIAL

## 2024-04-30 ENCOUNTER — ANESTHESIA EVENT (OUTPATIENT)
Dept: SURGERY | Facility: CLINIC | Age: 74
End: 2024-04-30
Payer: COMMERCIAL

## 2024-04-30 ENCOUNTER — ANESTHESIA (OUTPATIENT)
Dept: SURGERY | Facility: CLINIC | Age: 74
End: 2024-04-30
Payer: COMMERCIAL

## 2024-04-30 ENCOUNTER — HOSPITAL ENCOUNTER (OUTPATIENT)
Facility: CLINIC | Age: 74
Discharge: HOME | End: 2024-05-01
Attending: ORTHOPAEDIC SURGERY | Admitting: ORTHOPAEDIC SURGERY
Payer: COMMERCIAL

## 2024-04-30 ENCOUNTER — APPOINTMENT (OUTPATIENT)
Dept: GENERAL RADIOLOGY | Facility: CLINIC | Age: 74
End: 2024-04-30
Attending: STUDENT IN AN ORGANIZED HEALTH CARE EDUCATION/TRAINING PROGRAM
Payer: COMMERCIAL

## 2024-04-30 DIAGNOSIS — Z96.612 S/P SHOULDER REPLACEMENT, LEFT: Primary | ICD-10-CM

## 2024-04-30 LAB
FASTING STATUS PATIENT QL REPORTED: YES
GLUCOSE BLDC GLUCOMTR-MCNC: 169 MG/DL (ref 70–99)
GLUCOSE BLDC GLUCOMTR-MCNC: 178 MG/DL (ref 70–99)
GLUCOSE BLDC GLUCOMTR-MCNC: 234 MG/DL (ref 70–99)
GLUCOSE SERPL-MCNC: 167 MG/DL (ref 70–99)
HBA1C MFR BLD: 7.4 %
POTASSIUM SERPL-SCNC: 3.7 MMOL/L (ref 3.4–5.3)

## 2024-04-30 PROCEDURE — 82962 GLUCOSE BLOOD TEST: CPT

## 2024-04-30 PROCEDURE — 250N000025 HC SEVOFLURANE, PER MIN: Performed by: ORTHOPAEDIC SURGERY

## 2024-04-30 PROCEDURE — 250N000011 HC RX IP 250 OP 636

## 2024-04-30 PROCEDURE — 710N000009 HC RECOVERY PHASE 1, LEVEL 1, PER MIN: Performed by: ORTHOPAEDIC SURGERY

## 2024-04-30 PROCEDURE — 23472 RECONSTRUCT SHOULDER JOINT: CPT

## 2024-04-30 PROCEDURE — 250N000013 HC RX MED GY IP 250 OP 250 PS 637: Performed by: STUDENT IN AN ORGANIZED HEALTH CARE EDUCATION/TRAINING PROGRAM

## 2024-04-30 PROCEDURE — 250N000009 HC RX 250

## 2024-04-30 PROCEDURE — 84132 ASSAY OF SERUM POTASSIUM: CPT | Performed by: ANESTHESIOLOGY

## 2024-04-30 PROCEDURE — 250N000009 HC RX 250: Performed by: ORTHOPAEDIC SURGERY

## 2024-04-30 PROCEDURE — C1713 ANCHOR/SCREW BN/BN,TIS/BN: HCPCS | Performed by: ORTHOPAEDIC SURGERY

## 2024-04-30 PROCEDURE — 250N000012 HC RX MED GY IP 250 OP 636 PS 637: Performed by: PHYSICIAN ASSISTANT

## 2024-04-30 PROCEDURE — 360N000077 HC SURGERY LEVEL 4, PER MIN: Performed by: ORTHOPAEDIC SURGERY

## 2024-04-30 PROCEDURE — C9290 INJ, BUPIVACAINE LIPOSOME: HCPCS | Performed by: ANESTHESIOLOGY

## 2024-04-30 PROCEDURE — 258N000003 HC RX IP 258 OP 636: Performed by: STUDENT IN AN ORGANIZED HEALTH CARE EDUCATION/TRAINING PROGRAM

## 2024-04-30 PROCEDURE — 258N000003 HC RX IP 258 OP 636: Performed by: ANESTHESIOLOGY

## 2024-04-30 PROCEDURE — 272N000001 HC OR GENERAL SUPPLY STERILE: Performed by: ORTHOPAEDIC SURGERY

## 2024-04-30 PROCEDURE — 999N000065 XR SHOULDER LEFT PORT G/E 2 VIEWS: Mod: LT

## 2024-04-30 PROCEDURE — 250N000011 HC RX IP 250 OP 636: Performed by: STUDENT IN AN ORGANIZED HEALTH CARE EDUCATION/TRAINING PROGRAM

## 2024-04-30 PROCEDURE — 99100 ANES PT EXTEME AGE<1 YR&>70: CPT

## 2024-04-30 PROCEDURE — 83036 HEMOGLOBIN GLYCOSYLATED A1C: CPT | Performed by: PHYSICIAN ASSISTANT

## 2024-04-30 PROCEDURE — C1776 JOINT DEVICE (IMPLANTABLE): HCPCS | Performed by: ORTHOPAEDIC SURGERY

## 2024-04-30 PROCEDURE — 250N000009 HC RX 250: Performed by: ANESTHESIOLOGY

## 2024-04-30 PROCEDURE — 250N000011 HC RX IP 250 OP 636: Performed by: ANESTHESIOLOGY

## 2024-04-30 PROCEDURE — 370N000017 HC ANESTHESIA TECHNICAL FEE, PER MIN: Performed by: ORTHOPAEDIC SURGERY

## 2024-04-30 PROCEDURE — 36415 COLL VENOUS BLD VENIPUNCTURE: CPT | Performed by: PHYSICIAN ASSISTANT

## 2024-04-30 PROCEDURE — 99204 OFFICE O/P NEW MOD 45 MIN: CPT | Performed by: PHYSICIAN ASSISTANT

## 2024-04-30 PROCEDURE — 250N000011 HC RX IP 250 OP 636: Performed by: ORTHOPAEDIC SURGERY

## 2024-04-30 PROCEDURE — 23472 RECONSTRUCT SHOULDER JOINT: CPT | Performed by: ANESTHESIOLOGY

## 2024-04-30 PROCEDURE — 36415 COLL VENOUS BLD VENIPUNCTURE: CPT | Performed by: ANESTHESIOLOGY

## 2024-04-30 PROCEDURE — 999N000141 HC STATISTIC PRE-PROCEDURE NURSING ASSESSMENT: Performed by: ORTHOPAEDIC SURGERY

## 2024-04-30 PROCEDURE — 258N000003 HC RX IP 258 OP 636

## 2024-04-30 PROCEDURE — 82947 ASSAY GLUCOSE BLOOD QUANT: CPT | Performed by: ANESTHESIOLOGY

## 2024-04-30 DEVICE — IMPLANTABLE DEVICE
Type: IMPLANTABLE DEVICE | Site: SHOULDER | Status: FUNCTIONAL
Brand: TORNIER FLEX SHOULDER SYSTEM

## 2024-04-30 DEVICE — SCREW PERIPHERAL 18MM DWJ318: Type: IMPLANTABLE DEVICE | Site: SHOULDER | Status: FUNCTIONAL

## 2024-04-30 DEVICE — SCREW CENTRAL 6.5X30MM DWJ130: Type: IMPLANTABLE DEVICE | Site: SHOULDER | Status: FUNCTIONAL

## 2024-04-30 DEVICE — IMPLANTABLE DEVICE
Type: IMPLANTABLE DEVICE | Site: SHOULDER | Status: FUNCTIONAL
Brand: TORNIER PERFORM® REVERSED GLENOID

## 2024-04-30 DEVICE — SCREW PERIPHERAL 5.0X30MM DWJ330: Type: IMPLANTABLE DEVICE | Site: SHOULDER | Status: FUNCTIONAL

## 2024-04-30 DEVICE — SCREW PERIPHERAL 26MM: Type: IMPLANTABLE DEVICE | Site: SHOULDER | Status: FUNCTIONAL

## 2024-04-30 DEVICE — IMPLANTABLE DEVICE
Type: IMPLANTABLE DEVICE | Site: SHOULDER | Status: FUNCTIONAL
Brand: TORNIER PERFORM® REVERSED AUGMENTED GLENOID

## 2024-04-30 RX ORDER — LIDOCAINE 40 MG/G
CREAM TOPICAL
Status: DISCONTINUED | OUTPATIENT
Start: 2024-04-30 | End: 2024-05-01 | Stop reason: HOSPADM

## 2024-04-30 RX ORDER — ONDANSETRON 4 MG/1
4 TABLET, ORALLY DISINTEGRATING ORAL EVERY 6 HOURS PRN
Status: DISCONTINUED | OUTPATIENT
Start: 2024-04-30 | End: 2024-05-01 | Stop reason: HOSPADM

## 2024-04-30 RX ORDER — CEFAZOLIN SODIUM/WATER 2 G/20 ML
2 SYRINGE (ML) INTRAVENOUS
Status: COMPLETED | OUTPATIENT
Start: 2024-04-30 | End: 2024-04-30

## 2024-04-30 RX ORDER — HYDROMORPHONE HCL IN WATER/PF 6 MG/30 ML
0.2 PATIENT CONTROLLED ANALGESIA SYRINGE INTRAVENOUS
Status: DISCONTINUED | OUTPATIENT
Start: 2024-04-30 | End: 2024-05-01 | Stop reason: HOSPADM

## 2024-04-30 RX ORDER — HYDRALAZINE HYDROCHLORIDE 20 MG/ML
10 INJECTION INTRAMUSCULAR; INTRAVENOUS EVERY 4 HOURS PRN
Status: DISCONTINUED | OUTPATIENT
Start: 2024-04-30 | End: 2024-05-01 | Stop reason: HOSPADM

## 2024-04-30 RX ORDER — TRANEXAMIC ACID 650 MG/1
1950 TABLET ORAL ONCE
Status: COMPLETED | OUTPATIENT
Start: 2024-04-30 | End: 2024-04-30

## 2024-04-30 RX ORDER — PROPOFOL 10 MG/ML
INJECTION, EMULSION INTRAVENOUS PRN
Status: DISCONTINUED | OUTPATIENT
Start: 2024-04-30 | End: 2024-04-30

## 2024-04-30 RX ORDER — ASPIRIN 81 MG/1
81 TABLET ORAL 2 TIMES DAILY
Status: DISCONTINUED | OUTPATIENT
Start: 2024-04-30 | End: 2024-05-01 | Stop reason: HOSPADM

## 2024-04-30 RX ORDER — ROSUVASTATIN CALCIUM 5 MG/1
5 TABLET, COATED ORAL
COMMUNITY

## 2024-04-30 RX ORDER — CEFAZOLIN SODIUM 1 G/3ML
1 INJECTION, POWDER, FOR SOLUTION INTRAMUSCULAR; INTRAVENOUS EVERY 8 HOURS
Qty: 10 ML | Refills: 0 | Status: COMPLETED | OUTPATIENT
Start: 2024-04-30 | End: 2024-05-01

## 2024-04-30 RX ORDER — ACETAMINOPHEN 325 MG/1
975 TABLET ORAL ONCE
Status: COMPLETED | OUTPATIENT
Start: 2024-04-30 | End: 2024-04-30

## 2024-04-30 RX ORDER — SODIUM CHLORIDE, SODIUM LACTATE, POTASSIUM CHLORIDE, CALCIUM CHLORIDE 600; 310; 30; 20 MG/100ML; MG/100ML; MG/100ML; MG/100ML
INJECTION, SOLUTION INTRAVENOUS CONTINUOUS
Status: DISCONTINUED | OUTPATIENT
Start: 2024-04-30 | End: 2024-05-01 | Stop reason: HOSPADM

## 2024-04-30 RX ORDER — LATANOPROST 50 UG/ML
1 SOLUTION/ DROPS OPHTHALMIC 2 TIMES DAILY
COMMUNITY

## 2024-04-30 RX ORDER — TIMOLOL MALEATE 5 MG/ML
1 SOLUTION/ DROPS OPHTHALMIC 2 TIMES DAILY
Status: DISCONTINUED | OUTPATIENT
Start: 2024-04-30 | End: 2024-05-01 | Stop reason: HOSPADM

## 2024-04-30 RX ORDER — HYDROMORPHONE HCL IN WATER/PF 6 MG/30 ML
0.4 PATIENT CONTROLLED ANALGESIA SYRINGE INTRAVENOUS
Status: DISCONTINUED | OUTPATIENT
Start: 2024-04-30 | End: 2024-05-01 | Stop reason: HOSPADM

## 2024-04-30 RX ORDER — ONDANSETRON 2 MG/ML
INJECTION INTRAMUSCULAR; INTRAVENOUS PRN
Status: DISCONTINUED | OUTPATIENT
Start: 2024-04-30 | End: 2024-04-30

## 2024-04-30 RX ORDER — ACETAMINOPHEN 325 MG/1
975 TABLET ORAL EVERY 8 HOURS
Qty: 27 TABLET | Refills: 0 | Status: DISCONTINUED | OUTPATIENT
Start: 2024-04-30 | End: 2024-05-01 | Stop reason: HOSPADM

## 2024-04-30 RX ORDER — NALOXONE HYDROCHLORIDE 0.4 MG/ML
0.4 INJECTION, SOLUTION INTRAMUSCULAR; INTRAVENOUS; SUBCUTANEOUS
Status: DISCONTINUED | OUTPATIENT
Start: 2024-04-30 | End: 2024-05-01 | Stop reason: HOSPADM

## 2024-04-30 RX ORDER — PROCHLORPERAZINE MALEATE 5 MG
5 TABLET ORAL EVERY 6 HOURS PRN
Status: DISCONTINUED | OUTPATIENT
Start: 2024-04-30 | End: 2024-05-01 | Stop reason: HOSPADM

## 2024-04-30 RX ORDER — VANCOMYCIN HYDROCHLORIDE 1 G/20ML
INJECTION, POWDER, LYOPHILIZED, FOR SOLUTION INTRAVENOUS PRN
Status: DISCONTINUED | OUTPATIENT
Start: 2024-04-30 | End: 2024-04-30 | Stop reason: HOSPADM

## 2024-04-30 RX ORDER — CYCLOSPORINE 0.5 MG/ML
1 EMULSION OPHTHALMIC 2 TIMES DAILY
Status: DISCONTINUED | OUTPATIENT
Start: 2024-04-30 | End: 2024-05-01 | Stop reason: HOSPADM

## 2024-04-30 RX ORDER — NALOXONE HYDROCHLORIDE 0.4 MG/ML
0.2 INJECTION, SOLUTION INTRAMUSCULAR; INTRAVENOUS; SUBCUTANEOUS
Status: DISCONTINUED | OUTPATIENT
Start: 2024-04-30 | End: 2024-05-01 | Stop reason: HOSPADM

## 2024-04-30 RX ORDER — AMOXICILLIN 250 MG
1 CAPSULE ORAL 2 TIMES DAILY
Status: DISCONTINUED | OUTPATIENT
Start: 2024-04-30 | End: 2024-05-01 | Stop reason: HOSPADM

## 2024-04-30 RX ORDER — HYDROMORPHONE HCL IN WATER/PF 6 MG/30 ML
0.2 PATIENT CONTROLLED ANALGESIA SYRINGE INTRAVENOUS EVERY 5 MIN PRN
Status: DISCONTINUED | OUTPATIENT
Start: 2024-04-30 | End: 2024-04-30 | Stop reason: HOSPADM

## 2024-04-30 RX ORDER — CEFAZOLIN SODIUM/WATER 2 G/20 ML
2 SYRINGE (ML) INTRAVENOUS SEE ADMIN INSTRUCTIONS
Status: DISCONTINUED | OUTPATIENT
Start: 2024-04-30 | End: 2024-04-30 | Stop reason: HOSPADM

## 2024-04-30 RX ORDER — ACETAMINOPHEN 325 MG/1
650 TABLET ORAL EVERY 4 HOURS PRN
Status: DISCONTINUED | OUTPATIENT
Start: 2024-05-03 | End: 2024-05-01 | Stop reason: HOSPADM

## 2024-04-30 RX ORDER — FENTANYL CITRATE 0.05 MG/ML
50 INJECTION, SOLUTION INTRAMUSCULAR; INTRAVENOUS EVERY 5 MIN PRN
Status: DISCONTINUED | OUTPATIENT
Start: 2024-04-30 | End: 2024-04-30 | Stop reason: HOSPADM

## 2024-04-30 RX ORDER — HYDROXYZINE HYDROCHLORIDE 10 MG/1
10 TABLET, FILM COATED ORAL EVERY 6 HOURS PRN
Status: DISCONTINUED | OUTPATIENT
Start: 2024-04-30 | End: 2024-05-01 | Stop reason: HOSPADM

## 2024-04-30 RX ORDER — CYCLOSPORINE 0.5 MG/ML
1 EMULSION OPHTHALMIC 2 TIMES DAILY
COMMUNITY

## 2024-04-30 RX ORDER — SODIUM CHLORIDE, SODIUM LACTATE, POTASSIUM CHLORIDE, CALCIUM CHLORIDE 600; 310; 30; 20 MG/100ML; MG/100ML; MG/100ML; MG/100ML
INJECTION, SOLUTION INTRAVENOUS CONTINUOUS
Status: DISCONTINUED | OUTPATIENT
Start: 2024-04-30 | End: 2024-04-30 | Stop reason: HOSPADM

## 2024-04-30 RX ORDER — HYDROMORPHONE HCL IN WATER/PF 6 MG/30 ML
0.4 PATIENT CONTROLLED ANALGESIA SYRINGE INTRAVENOUS EVERY 5 MIN PRN
Status: DISCONTINUED | OUTPATIENT
Start: 2024-04-30 | End: 2024-04-30 | Stop reason: HOSPADM

## 2024-04-30 RX ORDER — LABETALOL HYDROCHLORIDE 5 MG/ML
10 INJECTION, SOLUTION INTRAVENOUS
Status: DISCONTINUED | OUTPATIENT
Start: 2024-04-30 | End: 2024-05-01 | Stop reason: HOSPADM

## 2024-04-30 RX ORDER — HYDROCHLOROTHIAZIDE 25 MG/1
25 TABLET ORAL DAILY
Status: DISCONTINUED | OUTPATIENT
Start: 2024-04-30 | End: 2024-05-01 | Stop reason: HOSPADM

## 2024-04-30 RX ORDER — ONDANSETRON 2 MG/ML
4 INJECTION INTRAMUSCULAR; INTRAVENOUS EVERY 30 MIN PRN
Status: DISCONTINUED | OUTPATIENT
Start: 2024-04-30 | End: 2024-04-30 | Stop reason: HOSPADM

## 2024-04-30 RX ORDER — OXYCODONE HYDROCHLORIDE 5 MG/1
10 TABLET ORAL EVERY 4 HOURS PRN
Status: DISCONTINUED | OUTPATIENT
Start: 2024-04-30 | End: 2024-05-01 | Stop reason: HOSPADM

## 2024-04-30 RX ORDER — LABETALOL HYDROCHLORIDE 5 MG/ML
10 INJECTION, SOLUTION INTRAVENOUS
Status: DISCONTINUED | OUTPATIENT
Start: 2024-04-30 | End: 2024-04-30 | Stop reason: HOSPADM

## 2024-04-30 RX ORDER — FENTANYL CITRATE 0.05 MG/ML
25 INJECTION, SOLUTION INTRAMUSCULAR; INTRAVENOUS EVERY 5 MIN PRN
Status: DISCONTINUED | OUTPATIENT
Start: 2024-04-30 | End: 2024-04-30 | Stop reason: HOSPADM

## 2024-04-30 RX ORDER — DEXTROSE MONOHYDRATE 25 G/50ML
25-50 INJECTION, SOLUTION INTRAVENOUS
Status: DISCONTINUED | OUTPATIENT
Start: 2024-04-30 | End: 2024-05-01 | Stop reason: HOSPADM

## 2024-04-30 RX ORDER — OXYCODONE HYDROCHLORIDE 5 MG/1
5 TABLET ORAL EVERY 4 HOURS PRN
Status: DISCONTINUED | OUTPATIENT
Start: 2024-04-30 | End: 2024-05-01 | Stop reason: HOSPADM

## 2024-04-30 RX ORDER — FENTANYL CITRATE 50 UG/ML
INJECTION, SOLUTION INTRAMUSCULAR; INTRAVENOUS PRN
Status: DISCONTINUED | OUTPATIENT
Start: 2024-04-30 | End: 2024-04-30

## 2024-04-30 RX ORDER — NALOXONE HYDROCHLORIDE 0.4 MG/ML
0.1 INJECTION, SOLUTION INTRAMUSCULAR; INTRAVENOUS; SUBCUTANEOUS
Status: DISCONTINUED | OUTPATIENT
Start: 2024-04-30 | End: 2024-04-30 | Stop reason: HOSPADM

## 2024-04-30 RX ORDER — ONDANSETRON 4 MG/1
4 TABLET, ORALLY DISINTEGRATING ORAL EVERY 30 MIN PRN
Status: DISCONTINUED | OUTPATIENT
Start: 2024-04-30 | End: 2024-04-30 | Stop reason: HOSPADM

## 2024-04-30 RX ORDER — TIMOLOL MALEATE 5 MG/ML
1 SOLUTION/ DROPS OPHTHALMIC 2 TIMES DAILY
COMMUNITY

## 2024-04-30 RX ORDER — NICOTINE POLACRILEX 4 MG
15-30 LOZENGE BUCCAL
Status: DISCONTINUED | OUTPATIENT
Start: 2024-04-30 | End: 2024-05-01 | Stop reason: HOSPADM

## 2024-04-30 RX ORDER — LIDOCAINE HYDROCHLORIDE 20 MG/ML
INJECTION, SOLUTION INFILTRATION; PERINEURAL PRN
Status: DISCONTINUED | OUTPATIENT
Start: 2024-04-30 | End: 2024-04-30

## 2024-04-30 RX ORDER — SODIUM CHLORIDE, SODIUM LACTATE, POTASSIUM CHLORIDE, CALCIUM CHLORIDE 600; 310; 30; 20 MG/100ML; MG/100ML; MG/100ML; MG/100ML
INJECTION, SOLUTION INTRAVENOUS CONTINUOUS PRN
Status: DISCONTINUED | OUTPATIENT
Start: 2024-04-30 | End: 2024-04-30

## 2024-04-30 RX ORDER — LATANOPROST 50 UG/ML
1 SOLUTION/ DROPS OPHTHALMIC 2 TIMES DAILY
Status: DISCONTINUED | OUTPATIENT
Start: 2024-04-30 | End: 2024-05-01 | Stop reason: HOSPADM

## 2024-04-30 RX ORDER — ONDANSETRON 2 MG/ML
4 INJECTION INTRAMUSCULAR; INTRAVENOUS EVERY 6 HOURS PRN
Status: DISCONTINUED | OUTPATIENT
Start: 2024-04-30 | End: 2024-05-01 | Stop reason: HOSPADM

## 2024-04-30 RX ADMIN — ROCURONIUM BROMIDE 30 MG: 50 INJECTION, SOLUTION INTRAVENOUS at 07:39

## 2024-04-30 RX ADMIN — TRANEXAMIC ACID 1950 MG: 650 TABLET ORAL at 06:11

## 2024-04-30 RX ADMIN — INSULIN ASPART 1 UNITS: 100 INJECTION, SOLUTION INTRAVENOUS; SUBCUTANEOUS at 14:28

## 2024-04-30 RX ADMIN — Medication 2 G: at 07:39

## 2024-04-30 RX ADMIN — CEFAZOLIN 1 G: 1 INJECTION, POWDER, FOR SOLUTION INTRAMUSCULAR; INTRAVENOUS at 17:03

## 2024-04-30 RX ADMIN — ACETAMINOPHEN 975 MG: 325 TABLET, FILM COATED ORAL at 21:48

## 2024-04-30 RX ADMIN — SODIUM CHLORIDE, POTASSIUM CHLORIDE, SODIUM LACTATE AND CALCIUM CHLORIDE: 600; 310; 30; 20 INJECTION, SOLUTION INTRAVENOUS at 10:57

## 2024-04-30 RX ADMIN — ONDANSETRON 4 MG: 2 INJECTION INTRAMUSCULAR; INTRAVENOUS at 09:02

## 2024-04-30 RX ADMIN — SENNOSIDES AND DOCUSATE SODIUM 1 TABLET: 50; 8.6 TABLET ORAL at 21:48

## 2024-04-30 RX ADMIN — BENZOCAINE 6 MG-MENTHOL 10 MG LOZENGES 1 LOZENGE: at 14:22

## 2024-04-30 RX ADMIN — ASPIRIN 81 MG: 81 TABLET, COATED ORAL at 11:03

## 2024-04-30 RX ADMIN — INSULIN ASPART 2 UNITS: 100 INJECTION, SOLUTION INTRAVENOUS; SUBCUTANEOUS at 18:07

## 2024-04-30 RX ADMIN — SODIUM CHLORIDE, POTASSIUM CHLORIDE, SODIUM LACTATE AND CALCIUM CHLORIDE: 600; 310; 30; 20 INJECTION, SOLUTION INTRAVENOUS at 07:30

## 2024-04-30 RX ADMIN — PROPOFOL 120 MG: 10 INJECTION, EMULSION INTRAVENOUS at 07:39

## 2024-04-30 RX ADMIN — LIDOCAINE HYDROCHLORIDE 1 ML: 10 INJECTION, SOLUTION EPIDURAL; INFILTRATION; INTRACAUDAL; PERINEURAL at 07:30

## 2024-04-30 RX ADMIN — LIDOCAINE HYDROCHLORIDE 60 MG: 20 INJECTION, SOLUTION INFILTRATION; PERINEURAL at 07:39

## 2024-04-30 RX ADMIN — BUPIVACAINE 10 ML: 13.3 INJECTION, SUSPENSION, LIPOSOMAL INFILTRATION at 07:02

## 2024-04-30 RX ADMIN — FENTANYL CITRATE 50 MCG: 50 INJECTION INTRAMUSCULAR; INTRAVENOUS at 07:39

## 2024-04-30 RX ADMIN — HYDROXYZINE HYDROCHLORIDE 10 MG: 10 TABLET ORAL at 21:48

## 2024-04-30 RX ADMIN — ACETAMINOPHEN 1000 MG: 500 TABLET, FILM COATED ORAL at 06:11

## 2024-04-30 RX ADMIN — SUGAMMADEX 200 MG: 100 INJECTION, SOLUTION INTRAVENOUS at 09:18

## 2024-04-30 RX ADMIN — SENNOSIDES AND DOCUSATE SODIUM 1 TABLET: 50; 8.6 TABLET ORAL at 11:03

## 2024-04-30 RX ADMIN — ONDANSETRON 4 MG: 4 TABLET, ORALLY DISINTEGRATING ORAL at 21:48

## 2024-04-30 RX ADMIN — PHENYLEPHRINE HYDROCHLORIDE 0.5 MCG/KG/MIN: 10 INJECTION INTRAVENOUS at 07:55

## 2024-04-30 RX ADMIN — OXYCODONE HYDROCHLORIDE 5 MG: 5 TABLET ORAL at 21:48

## 2024-04-30 RX ADMIN — ACETAMINOPHEN 975 MG: 325 TABLET, FILM COATED ORAL at 14:22

## 2024-04-30 RX ADMIN — BENZOCAINE 6 MG-MENTHOL 10 MG LOZENGES 1 LOZENGE: at 19:56

## 2024-04-30 RX ADMIN — BUPIVACAINE HYDROCHLORIDE 10 ML: 5 INJECTION, SOLUTION EPIDURAL; INTRACAUDAL at 07:02

## 2024-04-30 RX ADMIN — ASPIRIN 81 MG: 81 TABLET, COATED ORAL at 21:48

## 2024-04-30 RX ADMIN — BENZOCAINE 6 MG-MENTHOL 10 MG LOZENGES 1 LOZENGE: at 11:03

## 2024-04-30 RX ADMIN — CEFAZOLIN 1 G: 1 INJECTION, POWDER, FOR SOLUTION INTRAMUSCULAR; INTRAVENOUS at 23:47

## 2024-04-30 RX ADMIN — SODIUM CHLORIDE, POTASSIUM CHLORIDE, SODIUM LACTATE AND CALCIUM CHLORIDE: 600; 310; 30; 20 INJECTION, SOLUTION INTRAVENOUS at 07:37

## 2024-04-30 ASSESSMENT — ACTIVITIES OF DAILY LIVING (ADL)
ADLS_ACUITY_SCORE: 38
ADLS_ACUITY_SCORE: 23

## 2024-04-30 NOTE — ANESTHESIA PROCEDURE NOTES
Airway       Patient location during procedure: OR       Procedure Start/Stop Times: 4/30/2024 7:40 AM  Staff -        Anesthesiologist:  Jeronimo Louis MD       CRNA: Penelope Oliver APRN CRNA       Performed By: CRNA  Consent for Airway        Urgency: elective  Indications and Patient Condition       Indications for airway management: morelia-procedural       Induction type:intravenous       Mask difficulty assessment: 1 - vent by mask    Final Airway Details       Final airway type: endotracheal airway       Successful airway: ETT - single  Endotracheal Airway Details        ETT size (mm): 7.0       Successful intubation technique: video laryngoscopy       VL Blade Size: Glidescope 3       Grade View of Cords: 1       Adjucts: stylet       Position: Right       Measured from: lips       Secured at (cm): 21       Bite block used: None    Post intubation assessment        Placement verified by: capnometry, equal breath sounds and chest rise        Number of attempts at approach: 1       Number of other approaches attempted: 0       Secured with: tape       Ease of procedure: easy       Dentition: Intact and Unchanged    Medication(s) Administered   Medication Administration Time: 4/30/2024 7:40 AM

## 2024-04-30 NOTE — ANESTHESIA PREPROCEDURE EVALUATION
"Anesthesia Pre-Procedure Evaluation    Patient: Lorna Yanez   MRN: [id number] : 1950        Procedure : Procedure(s):  LEFT REVERSE SHOULDER ARTHROPLASTY , NO GUIDE          Past Medical History:   Diagnosis Date    HTN (hypertension)     Hyperlipidemia     Mild aortic valve sclerosis     Mitral valve annular calcification     Neuropathy     lower legs,feet  unknown origin    OA (osteoarthritis)     Thyroid nodule     Varicose veins of both lower extremities with complications       Past Surgical History:   Procedure Laterality Date    ARTHROPLASTY HIP Right 2021    Procedure: RIGHT TOTAL HIP ARTHROPLASTY;  Surgeon: Eladio Varma MD;  Location: [facility] OR    HYSTERECTOMY      KNEE ARTHROSCOPY Left     ORTHOPEDIC SURGERY Right     shoulder and knee    SHOULDER REPLACEMENT Right 2020    TOTAL KNEE ARTHROPLASTY Right     VARICOSE VEIN SURGERY        Allergies   Allergen Reactions    Codeine Anxiety and Nausea and Vomiting    Rosuvastatin Other (See Comments) and Dizziness     Per H&P  Throat felt swollen.      Amlodipine Other (See Comments)     EDEMA    Losartan Other (See Comments)     Paresthesias, \"subjective facial swelling not appreciated on exam\" per H&P      Social History     Tobacco Use    Smoking status: Never    Smokeless tobacco: Never   Substance Use Topics    Alcohol use: Never      Wt Readings from Last 1 Encounters:   24 65.3 kg (144 lb)        Anesthesia Evaluation            ROS/MED HX  ENT/Pulmonary:    (-) sleep apnea   Neurologic:     (+)    peripheral neuropathy,                            Cardiovascular:     (+) Dyslipidemia hypertension- -   -  - -                           valvular problems/murmurs type: AS Moderate AS;.         METS/Exercise Tolerance:     Hematologic:       Musculoskeletal:       GI/Hepatic:    (-) GERD   Renal/Genitourinary:       Endo:     (+)          thyroid problem, Thyroid disease - Other,           Psychiatric/Substance Use:     " "  Infectious Disease:       Malignancy:       Other:            Physical Exam    Airway        Mallampati: II   TM distance: > 3 FB   Neck ROM: full   Mouth opening: > 3 cm    Respiratory Devices and Support         Dental       (+) Modest Abnormalities - crowns, retainers, 1 or 2 missing teeth      Cardiovascular   cardiovascular exam normal          Pulmonary   pulmonary exam normal                OUTSIDE LABS:  CBC:   Lab Results   Component Value Date    WBC 5.9 08/28/2021    WBC 7.7 08/27/2021    HGB 9.3 (L) 08/29/2021    HGB 11.9 08/28/2021    HCT 35.1 08/28/2021    HCT 35.9 08/27/2021     08/28/2021     08/27/2021     BMP:   Lab Results   Component Value Date     (L) 08/28/2021     (L) 08/27/2021    POTASSIUM 3.6 08/28/2021    POTASSIUM 3.6 08/28/2021    CHLORIDE 100 08/28/2021    CHLORIDE 98 08/27/2021    CO2 30 08/28/2021    CO2 22 08/27/2021    BUN 12 08/28/2021    BUN 16 08/27/2021    CR 0.48 (L) 08/28/2021    CR 0.54 08/27/2021     (H) 08/29/2021     (H) 08/28/2021     COAGS:   Lab Results   Component Value Date    PTT 25 08/27/2021    INR 1.04 08/27/2021     POC: No results found for: \"BGM\", \"HCG\", \"HCGS\"  HEPATIC: No results found for: \"ALBUMIN\", \"PROTTOTAL\", \"ALT\", \"AST\", \"GGT\", \"ALKPHOS\", \"BILITOTAL\", \"BILIDIRECT\", \"SUNG\"  OTHER:   Lab Results   Component Value Date    LÓPEZ 8.6 08/28/2021       Anesthesia Plan    ASA Status:  3    NPO Status:  NPO Appropriate    Anesthesia Type: General.     - Airway: ETT   Induction: Intravenous.   Maintenance: Balanced.        Consents    Anesthesia Plan(s) and associated risks, benefits, and realistic alternatives discussed. Questions answered and patient/representative(s) expressed understanding.     - Discussed:     - Discussed with:  Patient            Postoperative Care    Pain management: IV analgesics, Peripheral nerve block (Single Shot).   PONV prophylaxis: Ondansetron (or other 5HT-3)     Comments:               " DAVID PÉREZ MD    I have reviewed the pertinent notes and labs in the chart from the past 30 days and (re)examined the patient.  Any updates or changes from those notes are reflected in this note.             # Drug Induced Platelet Defect: home medication list includes an antiplatelet medication

## 2024-04-30 NOTE — ANESTHESIA PROCEDURE NOTES
Brachial plexus Procedure Note    Pre-Procedure   Staff -        Anesthesiologist:  Jeronimo Louis MD       Performed By: Anesthesiologist       Location: pre-op       Pre-Anesthestic Checklist: patient identified, IV checked, site marked, risks and benefits discussed, informed consent, monitors and equipment checked, at physician/surgeon's request and post-op pain management  Timeout:       Correct Patient: Yes        Correct Procedure: Yes        Correct Site: Yes        Correct Position: Yes        Correct Laterality: Yes        Site Marked: Yes  Procedure Documentation  Procedure: Brachial plexus       Laterality: left       Patient Position: supine       Patient Prep/Sterile Barriers: sterile gloves, mask       Skin prep: Chloraprep       Local skin infiltrated with mL of 1% lidocaine.  (interscalene approach).       Needle Type: insulated and short bevel (Arrow)       Needle Gauge: 21.        Needle Length (millimeters): 90        Ultrasound guided       1. Ultrasound was used to identify targeted nerve, plexus, vascular marker, or fascial plane and place a needle adjacent to it in real-time.       2. Ultrasound was used to visualize the spread of anesthetic in close proximity to the above referenced structure.       3. A permanent image is entered into the patient's record.       4. The visualized anatomic structures appeared normal.       5. There were no apparent abnormal pathologic findings.    Assessment/Narrative         The placement was negative for: blood aspirated, painful injection and site bleeding       Paresthesias: No.       Bolus given via needle..        Secured via.        Insertion/Infusion Method: Single Shot       Complications: none    Medication(s) Administered   Bupivacaine 0.5% w/ 1:400K Epi (Injection) - Injection   10 mL - 4/30/2024 7:02:00 AM  Bupivacaine liposome (Exparel) 1.3% LA inj susp (Infiltration) - Infiltration   10 mL - 4/30/2024 7:02:00 AM   Comments:  Peripheral nerve  "block performed at request of the primary medical team.      Postoperative pain block requested by surgeon for severe postoperative pain.  Patient brought to Preop for procedure.      Pt tolerated well.    No complications.      The surgeon has given a verbal order transferring care of this patient to me for the performance of a regional analgesia block for post-op pain control. It is requested of me because I am uniquely trained and qualified to perform this block and the surgeon is neither trained nor qualified to perform this procedure.    DAVID PÉREZ MD   April 30, 2024 7:22 AM         FOR [facility] ([facility]) ONLY:   Pain Team Contact information: please page the Pain Team Via [facility]. Search \"Pain\". During daytime hours, please page the attending first. At night please page the resident first.      "

## 2024-04-30 NOTE — OP NOTE
[facility]    Operative Note    Pre-operative diagnosis: Glenohumeral arthritis, left [M19.012]  Post-operative diagnosis Same as pre-operative diagnosis    Procedure: LEFT REVERSE SHOULDER ARTHROPLASTY , NO GUIDE with autologus bone graft proximal humerous, Left - Shoulder    Surgeon: Surgeons and Role:     * German Ledbetter MD - Primary     * Earline Hooper PA-C - Assisting  Anesthesia: General with Block   Estimated Blood Loss: 100 mL from 4/30/2024  7:36 AM to 4/30/2024  9:29 AM      Drains: None  Specimens: * No specimens in log *  Findings:   None.  Complications: None.  Implants:   Implant Name Type Inv. Item Serial No.  Lot No. LRB No. Used Action   BASEPLATE LATERAL RVRS 25MM OFFS +3MM [id number] - [id number] Total Joint Component/Insert BASEPLATE LATERAL RVRS 25MM OFFS +3MM [id number] [employer]  Left 1 Implanted   SCREW CENTRAL 6.5X30MM [id number] - [id number] Metallic Hardware/[place] SCREW CENTRAL 6.5X30MM [id number]  [employer] 41 05 [id number] Left 1 Implanted   SCREW PERIPHERAL 5.0X30MM [id number] - [id number] Metallic Hardware/[place] SCREW PERIPHERAL 5.0X30MM [id number]  [employer] 41 05 [id number] Left 1 Implanted   SCREW PERIPHERAL 5.0X30MM [id number] - [id number] Metallic Hardware/[place] SCREW PERIPHERAL 5.0X30MM [id number]  [employer] 41 05 [id number] Left 1 Implanted   SCREW PERIPHERAL 18MM OFI030 - [id number] Metallic Hardware/[place] SCREW PERIPHERAL 18MM [id number]  [employer] 41 05 [id number] Left 1 Implanted   SCREW PERIPHERAL 26MM - [id number] Metallic Hardware/[place] SCREW PERIPHERAL 26MM  [employer] 41 05 [id number] Left 1 Implanted   GLEOSPHERE LATERALIZED AP REVERSED +2 36MM [id number] - [id number] Total Joint Component/Insert GLEOSPHERE LATERALIZED AP REVERSED +2 36MM [id number] [employer]  Left 1 Implanted   INSERT REVISION REVERSE +6/12 36MM - [id number] Total Joint Component/Insert INSERT REVISION REVERSE +6/12 36MM [id number] [employer]   Left 1 Implanted   TRAY REVERSE LOW OFFSET +0 [id number] - [id number] Total Joint Component/Insert TRAY REVERSE LOW OFFSET +0 [id number] [employer]  Left 1 Implanted   STEM HUMERAL ANATOMIC STD PTC 5B - [id number] Total Joint Component/Insert STEM HUMERAL ANATOMIC STD PTC 5B [id number] [employer]  Left 1 Implanted         NARRATIVE:  After discussing the risks, benefits, possible complications and alternatives, the patient voiced their understanding and gave consent . The patient wished to proceed.  An interscalene block was administered. The patient was then brought to the operating room and placed in a supine position. Following administration of general anesthetic, the patient was positioned in the modified beach chair position. All bony prominences were well-padded. A lateral chest pad was placed, and the head was secured with a Garcia head bradford with safety goggles in place.    After sterile prep and drape, a standard deltopectoral incision   was made, carried down through the skin and soft tissue. Electrocautery was used for hemostasis. Cephalic vein was taken laterally with the deltoid, and was noted to be intact throughout the case and at closure. The deltopectoral fascia was incised to approach the shoulder.    The superior cuff is torn.  The subscapularis is intact.    The biceps was tenodesed at the superior edge of the bicipital groove.  The subscapularis tendon is tenotomized 1cm medial to the insertion area for later repair.  This allows delivery of the head from the incision, after which a starting point   for the proximal guide was then well-visualized.  The proximal cut guide is used to check the anatomic version which is approximately 35 degrees. This was cut in 30 degrees of retroversion. Following this, it was then reamed and broached up to a size 5.  This was left in place and the protective cap was placed.     A Fukuda retractor was used to displace the humeral head posteriorly.   The glenoid was then prepared resecting the labrum in a 360 degree arc, and removing the residual biceps stump.  Once the appropriate approach had been obtained, the guide for the central pin was then positioned at the inferior aspect of the glenoid with a 5 degree inferior tilt.  The central pin was passed and checked along the medial scapular neck with my direct digital palpation, and noted to be in excellent position.  The pin was then reamed over to prepare the glenoid baseplate. There is excellent bone for fixation.    The baseplate is fully seated and drilled. Appropriate length screws were placed x4 with excellent fixation.  The cone reamer was used to remove any glenoid prominence.  The glenosphere was impacted into place and the central screw was fully tightened. This shows excellent fixation and good clearance inferiorly.    Following this, attention was turned to the humerus. The proximal humerus has areas of osteopenia and thinned cancellous bone.  Bone graft is harvested from the resected humeral head and impacted into place in the metaphysis of the humerus to obtain a more stable fit of the prosthesis and augment rotational stability.  The grafting also prevents late osteolysis lateral to the stem.  The baseplate was then trialed with the low offset metaphyseal tray and  6mm bearing, showing excellent fit, stability, and rotation. The arm could be brought through 145 degrees of passive forward flexion and 60 degrees of passive external rotation without any opening of the anterior joint line.  Instruments were removed. Final implants were seated after copious irrigation.  Using the same standard bearing and tray, this shows excellent stability after reduction.  1000 mL of brown wash followed by 1000 mL of normal saline was used for irrigation.    The subscapularis was then reattached with transtendon figure eight sutures x7.  This was done with the arm abducted 40 degrees and externally rotated 30  degrees to allow excellent excursion.  Copious irrigation was performed again with pulsatile lavage. The deltopectoral interval was closed with 0 Stratafix. The subcutaneous tissue was closed with 3-0 Vicryl and skin with 3-0 Stratafix. Generic Prineo dressing was applied.     Vancomycin powder 1gm total was used during the case and was applied to the intramedulary canal prior to stem placement, the joint space prior to subscapularis repair and the deep and superficial wound layers.    A physician assistant was necessary for this case to protect the neurovascular structures when retracting.  Maintaining the position of the arm during implantation of the components, Assisting with the dislocation of the implant trials and maintaining a clear operative field during the case. The PA is present for the entirety of the case.    The patient went to the recovery room in good condition, and will have a standard reverse total shoulder rehabilitation course.     COMPLICATIONS:  None.     CONDITION UPON DISCHARGE FOR OPERATING ROOM:  Stable    PLAN:  1. Antibiotic prophylaxis were given including Ancef. Abx given within 1 hr of surgical incision and discontinued within 24hrs.   2. DVT prophylaxis: aspirin and sequential compression device's will be started within 24hrs of surgery.   3. Weight Bearing NWB (Non weight bearing) left .upper extremity   4. Discharge anticipated date POD# 1 to home.   5. Pain Medication oral Oxycodone and IV Dilaudid.  6. Exercises: Full elbow, wrist, and hand AROM/PROM on operative side 5x/day.  Encourage ADLs out of sling as tolerated.

## 2024-04-30 NOTE — CONSULTS
[facility]  Consult Note - Hospitalist Service  Date of Admission:  4/30/2024  Date of Consultation: April 30, 2024  Consult Requested by: Orthopedics  Reason for Consult: Medication reconciliation and management of medical comorbidities    Assessment & Plan   Lorna Yanez is a 73 year old female with PMH significant for HTN, HLD, recently diagnosed aortic stenosis who was admitted to [facility] on 4/30/2024 by the orthopedics service for left reverse total shoulder.     S/p Left reverse total shoulder 4/30/2024   Surgery with Dr. German Ledbetter. GETA with block. EBL 100mL. No immediate postoperative complications.  - Routine postoperative cares per primary service, including IVF, pain control, abx, DVT ppx, and disposition  - PT/OT as per primary service  - Aggressive pulmonary toilet, frequent IS use 10x/hour while awake  - Discharge medication reconciliation not yet completed pending AM labs    Aortic stenosis, moderate  Recently diagnosed with echocardiogram, noted to have murmur prompting workup.  Saw cardiology 4/29 who recommended repeat echo in 1 year.  - Follow up with Cardiology as an outpatient    Benign essential hypertension  PTA regimen: hydrochlorothiazide 25mg/d  Reports BPs at home 120s/70s but has significantly elevated BPs at clinic visits.  - Hold PTA hydrochlorothiazide pending BMP, likely resume on discharge  - PRN IV labetalol/hydralazine available for SBP >180    Non-Insulin dependent type 2 diabetes  Previously known to have prediabetes, and recent A1c resulted at 7.8 consistent with diabetes.  Not currently on any medication but will be addressed postoperatively with primary care per records. No perioperative steroid.  - Hypoglycemia protocol  - Preprandial and HS fingerstick checks or Q 4 hours while NPO  - Sliding scale insulin Medium    Recent Labs   Lab 04/30/24  0607   *         Other pertinent history and chronic stable  diagnoses: Appropriate PTA medications will be resumed.  Hyperlipidemia  Hypertriglyceridemia  Mild transaminitis: Hepatitis panel neg  Mild hyponatremia - check BMP in a.m., 134 on preop  Thyroid nodule  Hx right total knee arthroplasty  Hx right total hip arthroplasty  Hx right shoulder replacement  Hysterectomy      Clinically Significant Risk Factors Present on Admission                # Drug Induced Platelet Defect: home medication list includes an antiplatelet medication   # Hypertension: Noted on problem list                 Patti Ruiz PA-C  Hospitalist Service  Securely message with [employer] (more info)  Text page via [employer] Paging[email or web address]     The patient's care was discussed with the Attending Physician, Dr. Moran, Patient, and Patient's Family.    ERIN Mello PA-C  Hospitalist [facility]  [facility]  Securely message with the Voc[employer] Web Console (learn more here)  Text page via [email or web address]  ______________________________________________________________________    Chief Complaint   Shoulder pain    History is obtained from the patient, electronic health record, and patient's spouse    History of Present Illness   Lorna GETACHEW Yanez is a 73 year old female with PMH significant for HTN, HLD, recently diagnosed aortic stenosis who was admitted to [facility] on 4/30/2024 by the orthopedics service for left reverse total shoulder. Surgery with Dr. German Ledbetter. GETA with block. EBL 100mL. No immediate postoperative complications. The Hospitalist service was consulted for medical co-management. Preoperative H&P reviewed. During my visit, feeling well, pain controlled. Answered all questions as able.      Past Medical History    Past Medical History:   Diagnosis Date    HTN (hypertension)     Hyperlipidemia     Mild aortic valve sclerosis     Mitral valve annular calcification     Neuropathy     lower legs,feet  unknown origin    OA  (osteoarthritis)     Thyroid nodule     Varicose veins of both lower extremities with complications        Past Surgical History   Past Surgical History:   Procedure Laterality Date    ARTHROPLASTY HIP Right 08/28/2021    Procedure: RIGHT TOTAL HIP ARTHROPLASTY;  Surgeon: Eladio Varma MD;  Location: [facility] OR    HYSTERECTOMY      KNEE ARTHROSCOPY Left     ORTHOPEDIC SURGERY Right     shoulder and knee    SHOULDER REPLACEMENT Right 03/05/2020    TOTAL KNEE ARTHROPLASTY Right     VARICOSE VEIN SURGERY         Medications   I have reviewed this patient's current medications  Medications Prior to Admission   Medication Sig Dispense Refill Last Dose    acetaminophen (TYLENOL) 325 MG tablet Take 2 tablets (650 mg) by mouth every 4 hours as needed for other (For optimal non-opioid multimodal pain management to improve pain control.) 30 tablet 1 4/30/2024    Ascorbic Acid (VITAMIN C) 500 MG CAPS Take 1 capsule by mouth daily   Past Week    aspirin (ASA) 325 MG EC tablet Take 1 tablet (325 mg) by mouth 2 times daily 60 tablet 1 More than a month    Coenzyme Q10 (COQ10 PO) Take 1 capsule (unknown dose) by mouth daily   Past Week    fish oil-omega-3 fatty acids 1000 MG capsule Take 1 g by mouth daily    Past Week    FOLIC ACID PO Take 1 tablet (unknown dose) by mouth daily   Past Week    hydrochlorothiazide (HYDRODIURIL) 25 MG tablet Take 25 mg by mouth daily   4/29/2024    Multiple Vitamins-Minerals (ZINC PO) Take 1 capsule (unknown dose) by mouth daily   Past Week    VITAMIN D PO Take 1 capsule (unknown dose) by mouth daily   Past Week          Review of Systems    The 10 point Review of Systems is negative other than noted in the HPI or here.       Social History   I have reviewed this patient's social history and updated it with pertinent information if needed.  Social History     Tobacco Use    Smoking status: Never    Smokeless tobacco: Never   Substance Use Topics    Alcohol use: Never    Drug use: Never  "        Allergies   Allergies   Allergen Reactions    Codeine Anxiety and Nausea and Vomiting    Rosuvastatin Other (See Comments) and Dizziness     Per H&P  Throat felt swollen.      Amlodipine Other (See Comments)     EDEMA    Losartan Other (See Comments)     Paresthesias, \"subjective facial swelling not appreciated on exam\" per H&P        Physical Exam   Vital Signs: Temp: 97.5  F (36.4  C) Temp src: Oral BP: (!) 132/95 Pulse: 95   Resp: 16 SpO2: 92 % O2 Device: None (Room air) Oxygen Delivery: 2 LPM  Weight: 144 lbs 0 oz    General: Awake, alert, very pleasant woman who appears stated age. Looks comfortable sitting up in bed. No acute distress.  HEENT: Normocephalic, atraumatic. Extraocular movements intact.   Respiratory: Clear to auscultation bilaterally, no rales, wheezing, or rhonchi.  Cardiovascular: Regular rate and rhythm, +S1 and S2, crescendo decrescendo holosystolic systolic murmur auscultated, loudest at RUSB. No peripheral edema. PCDs in place.  Gastrointestinal: Soft, non-tender, non-distended. Bowel sounds present.  Skin: Warm, dry. No obvious rashes or lesions on exposed skin. Dorsalis pedis pulses palpable bilaterally.  Musculoskeletal: Left shoulder sling in place. Numbness to left digits. Received block.  Neurologic: AAO x3.   Psychiatric: Appropriate mood and affect. No obvious anxiety or depression.    Medical Decision Making       40 MINUTES SPENT BY ME on the date of service doing chart review, history, exam, documentation & further activities per the note.      Data     I have personally reviewed the following data over the past 24 hrs:    N/A  \   N/A   / N/A     N/A N/A N/A /  167 (H)   3.7 N/A N/A \       Imaging results reviewed over the past 24 hrs:   Recent Results (from the past 24 hour(s))   XR Shoulder Left Port G/E 2 Views    Narrative    EXAM: XR SHOULDER LEFT PORT G/E 2 VIEWS  DATE/TIME: 4/30/2024 9:51 AM    INDICATION: Status post surgery  COMPARISON: None available.       " Impression    IMPRESSION: Left reverse total shoulder arthroplasty. Negative for  postoperative purposes.    YESENIA COMBS DO         SYSTEM ID:  [id number]

## 2024-04-30 NOTE — ANESTHESIA CARE TRANSFER NOTE
Patient: Lorna Yanez    Procedure: Procedure(s):  LEFT REVERSE SHOULDER ARTHROPLASTY , NO GUIDE with autologus bone graft proximal humerous       Diagnosis: Glenohumeral arthritis, left [M19.012]  Diagnosis Additional Information: No value filed.    Anesthesia Type:   General     Note:    Oropharynx: oropharynx clear of all foreign objects and spontaneously breathing  Level of Consciousness: awake  Oxygen Supplementation: face mask  Level of Supplemental Oxygen (L/min / FiO2): 6  Independent Airway: airway patency satisfactory and stable  Dentition: dentition unchanged  Vital Signs Stable: post-procedure vital signs reviewed and stable  Report to RN Given: handoff report given  Patient transferred to: PACU    Handoff Report: Identifed the Patient, Identified the Reponsible Provider, Reviewed the pertinent medical history, Discussed the surgical course, Reviewed Intra-OP anesthesia mangement and issues during anesthesia, Set expectations for post-procedure period and Allowed opportunity for questions and acknowledgement of understanding      Vitals:  Vitals Value Taken Time   /94 04/30/24 0930   Temp     Pulse 88 04/30/24 0935   Resp 13 04/30/24 0935   SpO2 97 % 04/30/24 0935   Vitals shown include unfiled device data.    Electronically Signed By: VELIA Trotter CRNA  April 30, 2024  9:36 AM

## 2024-04-30 NOTE — PLAN OF CARE
Summary: POD 0 Left shoulder arthroplasty  Orientation/Cognitive: A/Ox4  Mobility Level/Assist Equipment: SBA  Fall Risk (Y/N): yes  Behavior Concerns: none  Pain Management: no pain reported  Tele/VS/O2: VSS ex HTN on RA  ABNL Lab/BG: BG ACHS, last was 234  Diet: reg diet  Bowel/Bladder: continent  Skin Concerns: left shoulder incision CDI  Drains/Devices: PIV infusing LR at 75  Tests/Procedures for next shift: ortho following  Anticipated DC date & active delays: tbd

## 2024-04-30 NOTE — PROGRESS NOTES
Admission/Transfer from: PACU  2 RN skin assessment completed. Yes  Significant findings include: None, left shoulder incision CDI  WOC Nurse Consult Ordered? No

## 2024-04-30 NOTE — ANESTHESIA POSTPROCEDURE EVALUATION
Patient: Lorna Yanez    Procedure: Procedure(s):  LEFT REVERSE SHOULDER ARTHROPLASTY , NO GUIDE with autologus bone graft proximal humerous       Anesthesia Type:  General    Note:  Disposition: Admission   Postop Pain Control: Uneventful            Sign Out: Well controlled pain   PONV: No   Neuro/Psych: Uneventful            Sign Out: Acceptable/Baseline neuro status   Airway/Respiratory: Uneventful            Sign Out: Acceptable/Baseline resp. status   CV/Hemodynamics: Uneventful            Sign Out: Acceptable CV status; No obvious hypovolemia; No obvious fluid overload   Other NRE: NONE   DID A NON-ROUTINE EVENT OCCUR? No           Last vitals:  Vitals Value Taken Time   /78 04/30/24 1015   Temp 36.8  C (98.2  F) 04/30/24 1000   Pulse 75 04/30/24 1020   Resp 12 04/30/24 1020   SpO2 98 % 04/30/24 1020   Vitals shown include unfiled device data.    Electronically Signed By: DAVID PÉREZ MD  April 30, 2024  10:36 AM

## 2024-05-01 ENCOUNTER — APPOINTMENT (OUTPATIENT)
Dept: OCCUPATIONAL THERAPY | Facility: CLINIC | Age: 74
End: 2024-05-01
Attending: ORTHOPAEDIC SURGERY
Payer: COMMERCIAL

## 2024-05-01 VITALS
WEIGHT: 144 LBS | OXYGEN SATURATION: 95 % | HEART RATE: 95 BPM | SYSTOLIC BLOOD PRESSURE: 110 MMHG | TEMPERATURE: 98.4 F | HEIGHT: 66 IN | RESPIRATION RATE: 16 BRPM | DIASTOLIC BLOOD PRESSURE: 70 MMHG | BODY MASS INDEX: 23.14 KG/M2

## 2024-05-01 LAB
ANION GAP SERPL CALCULATED.3IONS-SCNC: 13 MMOL/L (ref 7–15)
BUN SERPL-MCNC: 12.4 MG/DL (ref 8–23)
CALCIUM SERPL-MCNC: 9 MG/DL (ref 8.8–10.2)
CHLORIDE SERPL-SCNC: 97 MMOL/L (ref 98–107)
CREAT SERPL-MCNC: 0.52 MG/DL (ref 0.51–0.95)
DEPRECATED HCO3 PLAS-SCNC: 24 MMOL/L (ref 22–29)
EGFRCR SERPLBLD CKD-EPI 2021: >90 ML/MIN/1.73M2
GLUCOSE BLDC GLUCOMTR-MCNC: 175 MG/DL (ref 70–99)
GLUCOSE BLDC GLUCOMTR-MCNC: 194 MG/DL (ref 70–99)
GLUCOSE SERPL-MCNC: 220 MG/DL (ref 70–99)
HGB BLD-MCNC: 11.6 G/DL (ref 11.7–15.7)
POTASSIUM SERPL-SCNC: 4.1 MMOL/L (ref 3.4–5.3)
SODIUM SERPL-SCNC: 134 MMOL/L (ref 135–145)

## 2024-05-01 PROCEDURE — 97165 OT EVAL LOW COMPLEX 30 MIN: CPT | Mod: GO

## 2024-05-01 PROCEDURE — 82962 GLUCOSE BLOOD TEST: CPT

## 2024-05-01 PROCEDURE — 97110 THERAPEUTIC EXERCISES: CPT | Mod: GO

## 2024-05-01 PROCEDURE — 36415 COLL VENOUS BLD VENIPUNCTURE: CPT | Performed by: STUDENT IN AN ORGANIZED HEALTH CARE EDUCATION/TRAINING PROGRAM

## 2024-05-01 PROCEDURE — 250N000013 HC RX MED GY IP 250 OP 250 PS 637: Performed by: STUDENT IN AN ORGANIZED HEALTH CARE EDUCATION/TRAINING PROGRAM

## 2024-05-01 PROCEDURE — 85018 HEMOGLOBIN: CPT | Performed by: STUDENT IN AN ORGANIZED HEALTH CARE EDUCATION/TRAINING PROGRAM

## 2024-05-01 PROCEDURE — 258N000003 HC RX IP 258 OP 636: Performed by: STUDENT IN AN ORGANIZED HEALTH CARE EDUCATION/TRAINING PROGRAM

## 2024-05-01 PROCEDURE — 97535 SELF CARE MNGMENT TRAINING: CPT | Mod: GO

## 2024-05-01 PROCEDURE — 80048 BASIC METABOLIC PNL TOTAL CA: CPT | Performed by: PHYSICIAN ASSISTANT

## 2024-05-01 RX ORDER — OXYCODONE HYDROCHLORIDE 5 MG/1
5 TABLET ORAL EVERY 6 HOURS PRN
Qty: 30 TABLET | Refills: 0 | Status: SHIPPED | OUTPATIENT
Start: 2024-05-01

## 2024-05-01 RX ORDER — ASPIRIN 81 MG/1
81 TABLET ORAL 2 TIMES DAILY
Qty: 60 TABLET | Refills: 0 | Status: SHIPPED | OUTPATIENT
Start: 2024-05-01

## 2024-05-01 RX ORDER — AMOXICILLIN 250 MG
1-2 CAPSULE ORAL 2 TIMES DAILY
Qty: 30 TABLET | Refills: 0 | Status: SHIPPED | OUTPATIENT
Start: 2024-05-01

## 2024-05-01 RX ADMIN — CYCLOSPORINE 1 DROP: 0.5 EMULSION OPHTHALMIC at 08:51

## 2024-05-01 RX ADMIN — TIMOLOL MALEATE 1 DROP: 5 SOLUTION/ DROPS OPHTHALMIC at 08:51

## 2024-05-01 RX ADMIN — ASPIRIN 81 MG: 81 TABLET, COATED ORAL at 08:50

## 2024-05-01 RX ADMIN — ACETAMINOPHEN 975 MG: 325 TABLET, FILM COATED ORAL at 05:25

## 2024-05-01 RX ADMIN — HYDROCHLOROTHIAZIDE 25 MG: 25 TABLET ORAL at 08:50

## 2024-05-01 RX ADMIN — OXYCODONE HYDROCHLORIDE 10 MG: 5 TABLET ORAL at 02:07

## 2024-05-01 RX ADMIN — SODIUM CHLORIDE, POTASSIUM CHLORIDE, SODIUM LACTATE AND CALCIUM CHLORIDE: 600; 310; 30; 20 INJECTION, SOLUTION INTRAVENOUS at 00:15

## 2024-05-01 RX ADMIN — HYDROXYZINE HYDROCHLORIDE 10 MG: 10 TABLET ORAL at 05:25

## 2024-05-01 RX ADMIN — OXYCODONE HYDROCHLORIDE 10 MG: 5 TABLET ORAL at 06:49

## 2024-05-01 RX ADMIN — INSULIN ASPART 2 UNITS: 100 INJECTION, SOLUTION INTRAVENOUS; SUBCUTANEOUS at 07:30

## 2024-05-01 RX ADMIN — SENNOSIDES AND DOCUSATE SODIUM 1 TABLET: 50; 8.6 TABLET ORAL at 08:50

## 2024-05-01 RX ADMIN — LATANOPROST 1 DROP: 50 SOLUTION/ DROPS OPHTHALMIC at 08:51

## 2024-05-01 ASSESSMENT — ACTIVITIES OF DAILY LIVING (ADL)
ADLS_ACUITY_SCORE: 23

## 2024-05-01 NOTE — PROGRESS NOTES
05/01/24 1110   Appointment Info   Signing Clinician's Name / Credentials (OT) Raysa Aguilera, MS, OTR/L   Quick Adds   Quick Adds Certification   Living Environment   People in Home spouse   Current Living Arrangements apartment   Home Accessibility   (Elevator. No stairs to use)   Living Environment Comments Pt reports she can have 24 hr assist at discharge   Self-Care   Usual Activity Tolerance good   Current Activity Tolerance moderate   Equipment Currently Used at Home grab bar, tub/shower;shower chair  (Step in shower. Standard height toilet. Adjustable bed. Plans to sleep in recliner)   Fall history within last six months yes   Number of times patient has fallen within last six months 1  (on black ice)   Activity/Exercise/Self-Care Comment Pt reports at baseline she is independent in self-cares without gait aid. Reports baseline mild balance impairment from neuropathy   Instrumental Activities of Daily Living (IADL)   IADL Comments Helps care for dog.   General Information   Onset of Illness/Injury or Date of Surgery 04/30/24   Referring Physician Earline Hooper PA-C   Patient/Family Therapy Goal Statement (OT) Return home   Additional Occupational Profile Info/Pertinent History of Current Problem s/p LEFT REVERSE SHOULDER ARTHROPLASTY , NO GUIDE with autologus bone graft proximal humerus on 4/30/2024. See EMR for PMH   Existing Precautions/Restrictions weight bearing;shoulder  (Sling with pillow. Codmans OK per activity orders. Encouraged ADLs out of sling as tolerated per order)   Cognitive Status Examination   Affect/Mental Status (Cognitive) WFL   Memory Deficit short-term memory  (mild memory impairment)   Executive Function Deficit   (mild deficit)   Sensory   Sensory Comments Pt reports mild numbness/tingling post op   Strength Comprehensive (MMT)   Comment, General Manual Muscle Testing (MMT) Assessment 5/5 MMT in dominant RUE. 3/5 MMT in LUE hand, wrist, elbow   Coordination   Upper Extremity  Coordination Left UE impaired   Transfers   Transfers toilet transfer   Transfer Comments Pt demonstrated independence in ambulation in silver without gait aid for 200 ft   Toilet Transfer   [place] Level (Toilet Transfer) modified independence   Activities of Daily Living   BADL Assessment/Intervention lower body dressing;upper body dressing;toileting;grooming   Upper Body Dressing Assessment/Training   [place] Level (Upper Body Dressing) maximum assist (25% patient effort)   Lower Body Dressing Assessment/Training   [place] Level (Lower Body Dressing) moderate assist (50% patient effort)   Grooming Assessment/Training   [place] Level (Grooming) minimum assist (75% patient effort)   Toileting   [place] Level (Toileting) modified independence   Clinical Impression   Criteria for Skilled Therapeutic Interventions Met (OT) Yes, treatment indicated   OT Diagnosis Decline function   OT Problem List-Impairments impacting ADL activity tolerance impaired;cognition;pain;post-surgical precautions;mobility   Assessment of Occupational Performance 3-5 Performance Deficits   Identified Performance Deficits LB dressing, UB dressing,   Planned Therapy Interventions (OT) ADL retraining;transfer training;home program guidelines;progressive activity/exercise   Clinical Decision Making Complexity (OT) problem focused assessment/low complexity   Risk & Benefits of therapy have been explained evaluation/treatment results reviewed;care plan/treatment goals reviewed;risks/benefits reviewed;current/potential barriers reviewed;participants voiced agreement with care plan;participants included;patient   OT Total Evaluation Time   OT Eval, Low Complexity Minutes (13144) 10   Therapy Certification   Medical Diagnosis Reverse L TSA   Start of Care Date 05/01/24   Certification date from 05/01/24   Certification date to 05/02/24   OT Goals   Therapy Frequency (OT) One time eval and treatment   OT Predicted Duration/Target  "Date for Goal Attainment 05/02/24   OT Goals Upper Body Dressing;Lower Body Dressing;Hygiene/Grooming;OT Goal 1;OT Goal 2   OT: Hygiene/Grooming supervision/stand-by assist   OT: Upper Body Dressing Moderate assist   OT: Lower Body Dressing Supervision/stand-by assist   OT: Goal 1 Pt will be independent in in LUE home exercise program per protocol to maintain LUE joint integrity for future LUE functional use   OT: Goal 2 Pt will verbalize understanding of pain management strategies, vehicle transfer technique, fall prevention, how to progress activity tolerance, of a safe plan for bathing   Interventions   Interventions Quick Adds Self-Care/Home Management;Therapeutic Procedures/Exercise   Self-Care/Home Management   Self-Care/Home Mgmt/ADL, Compensatory, Meal Prep Minutes (63515) 28   Symptoms Noted During/After Treatment (Meal Preparation/Planning Training) increased pain   Treatment Detail/Skilled Intervention Upon arrival pt agreeable to therapy. Pt participated in education regarding pain management strategies, vehicle transfer technique, fall prevention, how to progress activity tolerance, of a safe plan for bathing. Pt doffed sling with Miguel Angel and cues for precaution adherence. Donned pants with SBA and one cue for how to pull up in back left. Donned shirt with Miguel Angel and a repeat cue for technique. Donned sling with modA and max cues for proper fit using bathroom mirror as visual feedback. Pt participated in lengthy education regarding shoulder precaution and implications on selfcares and was issued \"self cares after shoulder surgery\" handout. Pt verbalized understanding of education. Pt denied questions or concerns regarding discharge home with support system today   Therapeutic Procedures/Exercise   Therapeutic Procedure: strength, endurance, ROM, flexibillity minutes (07838) 11   Symptoms Noted During/After Treatment increased pain   Treatment Detail/Skilled Intervention Pt seen for a focus on maintaining LUE " joint integrity. Pt engaged in 15 reps of the following LUE AROM per protocol: digit flexion/extension, radial/ulnar deviation, wrist flexion/extension, supination/pronation, elbow flexion/extension. Engaged in 15 reps of Codmans forward/backward, side to side, circles clockwise, circles counterclockwise. Pt participated in education regarding intensity, frequency, duration, signs of intolerance and was issued instruction handout. Pt required intermittent cues for breathing, pacing, to not activly abdjust L shoulder   OT Discharge Planning   OT Plan DC   OT Rationale for DC Rec Defer to ortho. Anticipate at discharge pt will require Miguel Angel with bathing, modA with UB dressing, supervision for toileting, assist with strenuous IADLs. Anticipate pt will need reminders for shoulder precaution adherence as memory impairment noted. Pt reports she can have this level of assist at home.   OT Equipment Needed at Discharge   (Reacher)   Total Session Time   Timed Code Treatment Minutes 39   Total Session Time (sum of timed and untimed services) 49      [facility]  OUTPATIENT OCCUPATIONAL THERAPY  EVALUATION  PLAN OF TREATMENT FOR OUTPATIENT REHABILITATION  (COMPLETE FOR INITIAL CLAIMS ONLY)  Patient's Last Name, First Name, M.I.  YOB: 1950  Lorna Yanez                          Provider's Name  [facility] Medical Record No.  [id number]                             Onset Date:  04/30/24   Start of Care Date:  05/01/24   Type:     ___PT   _X_OT   ___SLP Medical Diagnosis:  Reverse L TSA                    OT Diagnosis:  Decline function Visits from SOC:  1     See note for plan of treatment, functional goals and certification details    I CERTIFY THE NEED FOR THESE SERVICES FURNISHED UNDER        THIS PLAN OF TREATMENT AND WHILE UNDER MY CARE     (Physician co-signature of this document indicates review and certification of the therapy plan).                        **Attempts to have provider co-sign this med cert were unsuccessful despite reaching out to them and request signature multiple times: 5/8/24, 5/13/24, 5/28/24, 6/11/24, 6/19/24**

## 2024-05-01 NOTE — PLAN OF CARE
Patient vital signs are at baseline: Yes  Patient able to ambulate as they were prior to admission or with assist devices provided by therapies during their stay:  Yes  Patient MUST void prior to discharge:  Yes  Patient able to tolerate oral intake:  Yes  Pain has adequate pain control using Oral analgesics:  Yes  Does patient have an identified :  Yes  Has goal D/C date and time been discussed with patient:  Yes        VSS on RA. No IV access. Dressing CDI. SBA for transfer. Voids in the bathroom. Baseline numbness in both hands. Pain managed with PRN Oxycodone, Atarax, and scheduled Tylenol. Still with numbness on the L thumb and index finger.

## 2024-05-01 NOTE — DISCHARGE SUMMARY
HOSPITAL DISCHARGE SUMMARY    Patient Name: Lorna Yanez  YOB: 1950  Age: 73 year old  Medical Record Number: [id number]  Primary Physician: Ese Mercedes  Phone: [phone]  Admission Date: 4/30/2024  Discharge Date: 5/1/24    She will be discharged from [facility] to Home.    PRINCIPAL DISCHARGE DIAGNOSIS: left shoulder rotator cuff tear arthropathy    Patient Active Problem List   Diagnosis    Multiple abrasions    Closed fracture of right hip, initial encounter (H)    Hypokalemia    Essential hypertension    Neuropathy    S/P shoulder replacement, left       PROCEDURES PERFORMED DURING HOSPITALIZATION: left Reverse Total Shoulder Arthroplasty    BRIEF HOSPITAL COURSE: This is a pleasant 73 year old female who has had persistent complaints of pain that has failed non-operative management. Preoperative imaging revealed irreparable rotator cuff damage in the left shoulder.  The risks, benefits and possible complications of the above procedure were discussed with the patient. Patient elected to proceed to improve their lifestyle. Informed consent was obtained. For further details, please refer to the H&P in the chart.    The patient was admitted on 4/30/2024 and underwent the above-mentioned procedure. Patient tolerated this procedure well. Postoperatively, patient was seen in consultation by the internal medicine hospitalist service. Throughout the hospitalization, wound remained clean. The patient was started and advanced in a diet. CMS remained intact. Patient was seen and evaluated by occupational therapy. Hemoglobin was stable prior to discharge.    COMPLICATIONS IN HOSPITAL: None     PERTINENT FINDINGS/RESULTS AT DISCHARGE: Follow up ortho in 1 week. Follow up PCP for diabetes management discussion.       Latest Laboratory Results:  Chem:  Recent Labs   Lab Test 04/30/24  0607 08/28/21  0427   POTASSIUM 3.7 3.6  3.6     WBC/Hgb:  Recent Labs   Lab Test  08/29/21  0648 08/28/21  0427 08/27/21  2317   WBC  --  5.9 7.7   HGB 9.3* 11.9 12.2     INR:  Recent Labs   Lab Test 08/27/21  1629   INR 1.04       CONDITION AT DISCHARGE:    Doing well.  Continues to improve.  Pain is well-controlled.  No fevers.      DISCHARGE ORDERS  Aspirin 81 mg, 2 tablets, take once a day for 30 days then stop (to prevent blood clots) (over the counter)  Oxycodone 5 mg, 1 to 2 tablets, take every 4 to 6 hours as needed for pain, based upon pain scores  Tylenol 650 mg, 2 tablets, take every 8 hours around the clock for at least the first 5-7 days (over the counter)      After Discharge Recommendations:  1. Resume pre-admission diet  2. DVT prophylaxis: ASA 81 mg bid for one month.  3. Follow up: She should see Earline Hooper PA-C or Dr. Ledbetter in clinic in 7-10 days.  4. No sutures will need to be removed. Prineo dressing to be removed 3 weeks postoperatively. You may shower over dressing. Do not soak or submerge incision for 3 weeks.  5. Weight Bearing NWB (No weight bearing) left upper extremity.  6. Sling for one month. Wear at all times, including sleep. May remove to dress and bathe.  7. Additional followup: 1 week with ortho. Follow up with PCP for diabetes management    Total time spent for discharge on date of discharge: 41 minutes    Physician(s) in addition to primary physician who should receive a copy:  Primary Care Physician Ese Mercedes  [facility], Fax: [phone]    Earline Hooper PA-C ....................  5/1/2024   8:14 AM

## 2024-05-01 NOTE — DISCHARGE SUMMARY
Discharge    Patient discharged to Home via Car with [facility]  Care plan note Met    Listed belongings gathered and given to patient (including from security/pharmacy). Yes  Care Plan and Patient education resolved: Yes  Prescriptions if needed, hard copies sent with patient  NA  Medication Bin checked and emptied on discharge Yes  SW/care coordinator/charge RN aware of discharge: Yes

## 2024-05-01 NOTE — PLAN OF CARE
Date & Time: 4/30/24, [id number]  Summary: POD 0 L shoulder arthroplasty  Behavior & Aggression: green, anxious  Fall Risk: yes  Orientation: AxOx4  Neuro: intact, tingling to shoulder to elbow the numbness in hand from block with baseline neuropathy in bilateral feet  ABNL VS/O2: WNL  ABNL Labs: see chart  Pain Management: PRN oxycodone x1, scheduled tylenol, PRN atarax given  Bowel/Bladder: continent, voiding well   Drains: PIV infusing  Diet: regular  Activity Level: SBA  Tests/Procedures:   Anticipated  DC Date:   Significant Information:   Patient was told her's pre-diabetic and has an appointment in a few weeks, charge nurse gave her infromaiton about diabetes, patient is overall anxious about the diagnosis. Patient reported nausea in the past with oxycodone, x1 zofran given with the oxycodone

## 2024-05-01 NOTE — PROGRESS NOTES
"ORTHOPEDIC UPPER EXTREMITY PROGRESS NOTE    POD# 1  Patient is a 73 year old female who underwent Procedure(s):  LEFT REVERSE SHOULDER ARTHROPLASTY , NO GUIDE with autologus bone graft proximal humerus on 2024. Pain is well controlled. Tolerating medication well, no nausea or vomiting.  Discussed following up with PCP to discuss diabetes management.     Vitals:   Blood pressure (!) 141/81, pulse 90, temperature 98.5  F (36.9  C), temperature source Oral, resp. rate 16, height 1.664 m (5' 5.5\"), weight 65.3 kg (144 lb), SpO2 94%.  Temp (24hrs), Av.3  F (36.8  C), Min:97.5  F (36.4  C), Max:98.8  F (37.1  C)      EXAM   The patient is awake and alert.   Sensation is intact, mildly decreased left hand due to block still intact.   Digital Flexion/Extension maintained.   Brisk cap refill.   The incision is covered with prineo dressing. No redness, drainage or signs of infection.  Mild swelling of the shoulder. No ecchymosis.    Labs:   Recent Labs   Lab Test 21  0648 21  0427 21  2317 21  1629   HGB 9.3* 11.9 12.2  --    INR  --   --   --  1.04     ASSESSMENT  Left Reverse Shoulder Arthroplasty, no guide     PLAN  1. DVT prophylaxis: aspirin. Patient has 81 mg ASA at home. Will take bid for 1 month to prevent blood clot.   2. Weight Bearing NWB (Non weight bearing).  3. Wound Care: remove prineo dressing 3 weeks post-op. May shower today. Do not soak or submerge for 3 weeks.   4. Discharge anticipated date today. Home with No Skilled Service  5. Cont Pain Control Oxycodone prn. Tylenol arthritis strength otc to take for at least the first 5-7 days.   6. Continue with shoulder immobilizer.  Okay to remove for ADLs.  Okay for active range of motion for elbow, wrist and digits.  7. Follow-up in clinic in 1 week.     Earline Hooper PA-C  [facility]     "

## 2024-05-01 NOTE — PLAN OF CARE
Occupational Therapy Discharge Summary    Reason for therapy discharge:    All goals and outcomes met, no further needs identified.    Progress towards therapy goal(s). See goals on Care Plan in [facility] electronic health record for goal details.  Goals met    Therapy recommendation(s):    Defer to ortho. Anticipate at discharge pt will require Miguel Angel with bathing, modA with UB dressing, supervision for toileting, assist with strenuous IADLs. Anticipate pt will need reminders for shoulder precaution adherence as memory impairment noted. Pt reports she can have this level of assist at home.

## (undated) DEVICE — SUCTION IRR SYSTEM W/O TIP INTERPULSE HANDPIECE 0210-100-000

## (undated) DEVICE — BONE CLEANING TIP INTERPULSE  0210-010-000

## (undated) DEVICE — SOL NACL 0.9% IRRIG 3000ML BAG 07972-08

## (undated) DEVICE — MANIFOLD NEPTUNE 4 PORT 700-20

## (undated) DEVICE — BLADE SAW SAGITTAL STRK MED WIDE 25X73X0.89MM 2108-105-000

## (undated) DEVICE — DRAPE STERI U 1015

## (undated) DEVICE — SU STRATAFIX MONOCRYL 4-0 SPIRAL 30CM PS-2 SXMP1B117

## (undated) DEVICE — GOWN IMPERVIOUS SPECIALTY XL/XLONG 39049

## (undated) DEVICE — SOL NACL 0.9% IRRIG 1000ML BOTTLE 2F7124

## (undated) DEVICE — SU STRATAFIX 2-0 MH 36" SXPD2B412

## (undated) DEVICE — GLOVE PROTEXIS MICRO 8.5  2D73PM85

## (undated) DEVICE — SU VICRYL 2-0 CT-1 27" UND J259H

## (undated) DEVICE — PACK SET-UP STD 9102

## (undated) DEVICE — BONE CLEANING TIP INTERPULSE FEMORAL CANAL 0210-008-000

## (undated) DEVICE — PIN ALIGNMENT 2.5X200MM

## (undated) DEVICE — ESU GROUND PAD E7506

## (undated) DEVICE — ESU ELEC BLADE 4" COATED

## (undated) DEVICE — GLOVE BIOGEL PI SZ 7.5 40875

## (undated) DEVICE — SU VICRYL 2-0 CT-2 27" J333H

## (undated) DEVICE — SPONGE LAP 18X18" X8435

## (undated) DEVICE — GLOVE BIOGEL PI MICRO INDICATOR UNDERGLOVE SZ 8.0 48980

## (undated) DEVICE — IMM PILLOW ABDUCT HIP MED 31143061

## (undated) DEVICE — LINEN TOWEL PACK X5 5464

## (undated) DEVICE — DRILL BIT PERIPHERAL SCREW 3.2MM MWJ126

## (undated) DEVICE — CEMENT PRESSURIZER FEMORAL CANAL MED 0206-546-000

## (undated) DEVICE — SU VICRYL 1 CT 36" J959H

## (undated) DEVICE — SU STRATAFIX PDS PLUS 0 CT 45CM SXPP1A406

## (undated) DEVICE — DRAPE SPLIT SHEET 77X108 REINFORCED 29436

## (undated) DEVICE — DRSG AQUACEL AG 3.5X9.75" HYDROFIBER 412011

## (undated) DEVICE — GOWN IMPERVIOUS ZONED XLG 9041

## (undated) DEVICE — PACK TOTAL HIP W/POUCH SOP15HPFSM

## (undated) DEVICE — HOOD SURG T7PLUS PEEL AWAY FACE SHIELD STRL LF 0416-801-100

## (undated) DEVICE — BLADE SAW SAGITTAL STRK 25X90X1.37MM 4H SYS 6 6125-137-090

## (undated) DEVICE — SOLUTION WOUND CLEANSING 3/4OZ 10% PVP EA-L3011FB-50

## (undated) DEVICE — SUCTION TIP YANKAUER STR K87

## (undated) DEVICE — DRAPE STERI INCISE 1050

## (undated) DEVICE — SU FIBERWIRE 2 38"  AR-7200

## (undated) DEVICE — TUBING SUCTION VACUUM COLLECTION 6FT 610

## (undated) DEVICE — SU DERMABOND PRINEO 42CM CLR422US

## (undated) DEVICE — PREP CHLORAPREP 26ML TINTED HI-LITE ORANGE 930815

## (undated) DEVICE — Device

## (undated) DEVICE — PAD FOAM MCGUIRE KIT 0814-0150

## (undated) DEVICE — DRAPE ARTHROSCOPY SHOULDER BEACHCHAIR 29369

## (undated) DEVICE — SYR 50ML LL W/O NDL 309653

## (undated) DEVICE — HOOD FLYTE W/PEELAWAY 408-800-100

## (undated) DEVICE — WRAP SHOULDER THERAPUTIC B-COOL 0814-3321

## (undated) DEVICE — DRAPE CONVERTORS U-DRAPE 60X72" 8476

## (undated) DEVICE — DRAPE IOBAN INCISE 23X17" 6650EZ

## (undated) DEVICE — BLADE SAW SAGITTAL STRK 20.7X85X0.89MM 2108-109-000S13

## (undated) DEVICE — SU ETHIBOND 5 V-37 4X30" MB66G

## (undated) DEVICE — ESU PENCIL W/SMOKE EVAC NEPTUNE STRYKER 0703-046-000

## (undated) DEVICE — ESU GROUND PAD UNIVERSAL W/O CORD

## (undated) DEVICE — PACK OPEN SHOULDER SOP15OCFSC

## (undated) DEVICE — SU VICRYL 3-0 SH 27" UND J416H

## (undated) DEVICE — SOL WATER IRRIG 1000ML BOTTLE 2F7114

## (undated) RX ORDER — PROPOFOL 10 MG/ML
INJECTION, EMULSION INTRAVENOUS
Status: DISPENSED
Start: 2021-08-28

## (undated) RX ORDER — HYDROMORPHONE HCL IN WATER/PF 6 MG/30 ML
PATIENT CONTROLLED ANALGESIA SYRINGE INTRAVENOUS
Status: DISPENSED
Start: 2021-08-28

## (undated) RX ORDER — HYDROMORPHONE HYDROCHLORIDE 1 MG/ML
INJECTION, SOLUTION INTRAMUSCULAR; INTRAVENOUS; SUBCUTANEOUS
Status: DISPENSED
Start: 2021-08-28

## (undated) RX ORDER — FENTANYL CITRATE 0.05 MG/ML
INJECTION, SOLUTION INTRAMUSCULAR; INTRAVENOUS
Status: DISPENSED
Start: 2021-08-28

## (undated) RX ORDER — DEXAMETHASONE SODIUM PHOSPHATE 4 MG/ML
INJECTION, SOLUTION INTRA-ARTICULAR; INTRALESIONAL; INTRAMUSCULAR; INTRAVENOUS; SOFT TISSUE
Status: DISPENSED
Start: 2024-04-30

## (undated) RX ORDER — TRANEXAMIC ACID 650 MG/1
TABLET ORAL
Status: DISPENSED
Start: 2024-04-30

## (undated) RX ORDER — PROPOFOL 10 MG/ML
INJECTION, EMULSION INTRAVENOUS
Status: DISPENSED
Start: 2024-04-30

## (undated) RX ORDER — LIDOCAINE HYDROCHLORIDE 20 MG/ML
INJECTION, SOLUTION EPIDURAL; INFILTRATION; INTRACAUDAL; PERINEURAL
Status: DISPENSED
Start: 2021-08-28

## (undated) RX ORDER — ACETAMINOPHEN 500 MG
TABLET ORAL
Status: DISPENSED
Start: 2024-04-30

## (undated) RX ORDER — CEFAZOLIN SODIUM 2 G/100ML
INJECTION, SOLUTION INTRAVENOUS
Status: DISPENSED
Start: 2021-08-28

## (undated) RX ORDER — ONDANSETRON 2 MG/ML
INJECTION INTRAMUSCULAR; INTRAVENOUS
Status: DISPENSED
Start: 2024-04-30

## (undated) RX ORDER — ONDANSETRON 2 MG/ML
INJECTION INTRAMUSCULAR; INTRAVENOUS
Status: DISPENSED
Start: 2021-08-28

## (undated) RX ORDER — TRANEXAMIC ACID 10 MG/ML
INJECTION, SOLUTION INTRAVENOUS
Status: DISPENSED
Start: 2021-08-28

## (undated) RX ORDER — DEXAMETHASONE SODIUM PHOSPHATE 4 MG/ML
INJECTION, SOLUTION INTRA-ARTICULAR; INTRALESIONAL; INTRAMUSCULAR; INTRAVENOUS; SOFT TISSUE
Status: DISPENSED
Start: 2021-08-28

## (undated) RX ORDER — FENTANYL CITRATE 50 UG/ML
INJECTION, SOLUTION INTRAMUSCULAR; INTRAVENOUS
Status: DISPENSED
Start: 2024-04-30

## (undated) RX ORDER — FENTANYL CITRATE 50 UG/ML
INJECTION, SOLUTION INTRAMUSCULAR; INTRAVENOUS
Status: DISPENSED
Start: 2021-08-28